# Patient Record
Sex: MALE | Race: WHITE | Employment: UNEMPLOYED | URBAN - METROPOLITAN AREA
[De-identification: names, ages, dates, MRNs, and addresses within clinical notes are randomized per-mention and may not be internally consistent; named-entity substitution may affect disease eponyms.]

---

## 2017-01-06 DIAGNOSIS — F41.1 GENERALIZED ANXIETY DISORDER: Primary | ICD-10-CM

## 2017-01-06 RX ORDER — CLONAZEPAM 1 MG/1
TABLET ORAL
Qty: 10 TABLET | Refills: 0 | Status: SHIPPED | OUTPATIENT
Start: 2017-01-06 | End: 2017-01-09

## 2017-01-06 NOTE — TELEPHONE ENCOUNTER
Controlled Substance Refill Request for clonazePAM (KLONOPIN) 1 MG tablet  Problem List Complete:  Yes  Patient is followed by HEIDI PENN for ongoing prescription of benzodiazepines.  All refills should be approved by this provider, or covering partner.    Medication(s): clonazepam 1 mg BID.   Maximum quantity per month: 60  Clinic visit frequency required: Q 3 months     Controlled substance agreement on file: No  Benzodiazepine use reviewed by psychiatry:  No    Last [facility] website verification:  none   [email or web address]/   checked in past 6 months?  No, route to RN     [place]: pt was getting Clonazepam and Gabapentin 400 mg and 600 mg from Nguyen Pritchard RN up until 11/13/16, nothing since then except from [facility].      [facility]  [email or web address]    Last refill 11/21/16, #60  Last office visit 12/6/16

## 2017-01-06 NOTE — TELEPHONE ENCOUNTER
Hard copy to station 2, BX  Needs to be seen for more   Needs a Controlled substance agreement for this

## 2017-01-09 ENCOUNTER — OFFICE VISIT (OUTPATIENT)
Dept: FAMILY MEDICINE | Facility: CLINIC | Age: 32
End: 2017-01-09
Payer: COMMERCIAL

## 2017-01-09 VITALS
WEIGHT: 141 LBS | OXYGEN SATURATION: 97 % | TEMPERATURE: 98.6 F | HEART RATE: 77 BPM | SYSTOLIC BLOOD PRESSURE: 122 MMHG | DIASTOLIC BLOOD PRESSURE: 70 MMHG | RESPIRATION RATE: 16 BRPM | BODY MASS INDEX: 18.61 KG/M2

## 2017-01-09 DIAGNOSIS — F41.1 GENERALIZED ANXIETY DISORDER: Primary | ICD-10-CM

## 2017-01-09 DIAGNOSIS — F17.200 TOBACCO USE DISORDER: ICD-10-CM

## 2017-01-09 PROCEDURE — 99213 OFFICE O/P EST LOW 20 MIN: CPT | Performed by: FAMILY MEDICINE

## 2017-01-09 RX ORDER — CLONAZEPAM 1 MG/1
TABLET ORAL
Qty: 60 TABLET | Refills: 2 | Status: SHIPPED | OUTPATIENT
Start: 2017-01-09 | End: 2017-04-03

## 2017-01-09 ASSESSMENT — ANXIETY QUESTIONNAIRES
1. FEELING NERVOUS, ANXIOUS, OR ON EDGE: SEVERAL DAYS
6. BECOMING EASILY ANNOYED OR IRRITABLE: NOT AT ALL
5. BEING SO RESTLESS THAT IT IS HARD TO SIT STILL: SEVERAL DAYS
2. NOT BEING ABLE TO STOP OR CONTROL WORRYING: NOT AT ALL
GAD7 TOTAL SCORE: 3
7. FEELING AFRAID AS IF SOMETHING AWFUL MIGHT HAPPEN: NOT AT ALL
IF YOU CHECKED OFF ANY PROBLEMS ON THIS QUESTIONNAIRE, HOW DIFFICULT HAVE THESE PROBLEMS MADE IT FOR YOU TO DO YOUR WORK, TAKE CARE OF THINGS AT HOME, OR GET ALONG WITH OTHER PEOPLE: NOT DIFFICULT AT ALL
3. WORRYING TOO MUCH ABOUT DIFFERENT THINGS: NOT AT ALL

## 2017-01-09 ASSESSMENT — PATIENT HEALTH QUESTIONNAIRE - PHQ9: 5. POOR APPETITE OR OVEREATING: SEVERAL DAYS

## 2017-01-09 NOTE — PROGRESS NOTES
SUBJECTIVE:                                                    Ramon Jamison is a 31 year old male who presents to clinic today for the following health issues:      Anxiety Follow-Up    Status since last visit: No change    Other associated symptoms:None    Complicating factors:   Significant life event: No   Current substance abuse: None  Depression symptoms: No  TAMMY-7 SCORE 10/19/2016 11/21/2016   Total Score 10 7        GAD7             Amount of exercise or physical activity: 4-5 days/week for an average of 30-45 minutes    Problems taking medications regularly: No    Medication side effects: none    Diet: regular (no restrictions)        Problem list and histories reviewed & adjusted, as indicated.  Additional history: as documented    Problem list, Medication list, Allergies, and Medical/Social/Surgical histories reviewed in EPIC and updated as appropriate.    ROS:  Constitutional, HEENT, cardiovascular, pulmonary, gi and gu systems are negative, except as otherwise noted.    OBJECTIVE:                                                    /70 mmHg  Pulse 77  Temp(Src) 98.6  F (37  C) (Tympanic)  Resp 16  Wt 141 lb (63.957 kg)  SpO2 97%  Body mass index is 18.61 kg/(m^2).  GENERAL APPEARANCE: healthy, alert and no distress         ASSESSMENT/PLAN:                                                        ICD-10-CM    1. Generalized anxiety disorder F41.1 sertraline (ZOLOFT) 50 MG tablet     clonazePAM (KLONOPIN) 1 MG tablet   2. Tobacco use disorder F17.200        Patient Instructions   Will see back in March.  Medication refills given.        Yeyo Watson MD  [facility]

## 2017-01-09 NOTE — NURSING NOTE
"Chief Complaint   Patient presents with     Anxiety       Initial /70 mmHg  Pulse 77  Temp(Src) 98.6  F (37  C) (Tympanic)  Resp 16  Wt 141 lb (63.957 kg)  SpO2 97% Estimated body mass index is 18.61 kg/(m^2) as calculated from the following:    Height as of 12/6/16: 6' 1\" (1.854 m).    Weight as of this encounter: 141 lb (63.957 kg).  BP completed using cuff size: regular    "

## 2017-01-10 ASSESSMENT — ANXIETY QUESTIONNAIRES: GAD7 TOTAL SCORE: 3

## 2017-04-01 ENCOUNTER — TELEPHONE (OUTPATIENT)
Dept: NURSING | Facility: CLINIC | Age: 32
End: 2017-04-01

## 2017-04-02 NOTE — TELEPHONE ENCOUNTER
"Call Type: Triage Call    Presenting Problem: \"I am going to run out of my Klonopin, sometime  on Monday, and I have a Dr. appointment, scheduled for Tuesday.\" Pt.  says that he has enough medication, until sometime on Monday  ([birth year]). RN then told pt. that pt. can call his pharmacy, and see  if the pharmacist would give him a few pills, until he sees his Dr.,  on Tuesday. Pt. voiced understanding. RN also told pt. that pt. can  speak to his Dr., on Monday, and see if the DrGenevieve would get him a few  extra Klonopin pills, until he has his appointment. Pt. voiced  understanding.  Triage Note:  Guideline Title: Information Only Call; No Symptom Triage (Adult)  Recommended Disposition: Call Provider When Office is Open  Original Inclination: Wanted to speak with a nurse  Override Disposition:  Intended Action: Follow advice given  Physician Contacted: No  Requesting information and provider is best resource; no triage required. ?  YES  Requesting regular office appointment ? NO  Sign(s) or symptom(s) associated with a diagnosed condition or with a new illness  ? NO  Requesting information about provider, services or community resources ? NO  Call back to complete assessment/clarification of information from prior caller to  complete triage ? NO  Physician Instructions:  Care Advice:  "

## 2017-04-03 DIAGNOSIS — F41.1 GENERALIZED ANXIETY DISORDER: ICD-10-CM

## 2017-04-03 RX ORDER — CLONAZEPAM 1 MG/1
TABLET ORAL
Qty: 30 TABLET | Refills: 0 | Status: SHIPPED | OUTPATIENT
Start: 2017-04-03 | End: 2017-04-04

## 2017-04-03 NOTE — TELEPHONE ENCOUNTER
Reason for Call:  Medication or medication refill:    Do you use a [facility]?  Name of the pharmacy and phone number for the current request:  [facility] [id number] [place], [place] [address], [place] - [address] AT [facility] & [facility] [phone] (Phone)    Name of the medication requested: Clonazepam    Other request: Patient would like this approved today as he is out of medication.  He has an appointment scheduled for tomorrow which he plans on keeping    Can we leave a detailed message on this number? YES    Phone number patient can be reached at: [phone]          Best Time: When Prescription is approved.    Call taken on 4/3/2017 at 11:13 AM by LEV SELLERS

## 2017-04-03 NOTE — TELEPHONE ENCOUNTER
Clonazepam   -patient states he is out of this med- has appointment scheduled for tomorrow .   Last Written Prescription Date:  1-9-17  Last Fill Quantity: 60,   # refills: 2  Last Office Visit with [facility] or [facility] prescribing provider: 1-9-17  Future Office visit:    Next 5 appointments (look out 90 days)     Apr 04, 2017 11:45 AM CDT   [facility]    [address] [place] [address]   [phone]                   Routing refill request to provider for review/approval because:  Drug not on the [facility], [facility] or [facility] refill protocol or controlled substance

## 2017-04-04 ENCOUNTER — TELEPHONE (OUTPATIENT)
Dept: FAMILY MEDICINE | Facility: CLINIC | Age: 32
End: 2017-04-04

## 2017-04-04 ENCOUNTER — OFFICE VISIT (OUTPATIENT)
Dept: FAMILY MEDICINE | Facility: CLINIC | Age: 32
End: 2017-04-04
Payer: COMMERCIAL

## 2017-04-04 VITALS
DIASTOLIC BLOOD PRESSURE: 70 MMHG | WEIGHT: 146 LBS | SYSTOLIC BLOOD PRESSURE: 120 MMHG | HEART RATE: 66 BPM | BODY MASS INDEX: 19.35 KG/M2 | HEIGHT: 73 IN | TEMPERATURE: 98 F | RESPIRATION RATE: 16 BRPM

## 2017-04-04 DIAGNOSIS — F41.1 GENERALIZED ANXIETY DISORDER: ICD-10-CM

## 2017-04-04 PROCEDURE — 99213 OFFICE O/P EST LOW 20 MIN: CPT | Performed by: FAMILY MEDICINE

## 2017-04-04 RX ORDER — GABAPENTIN 600 MG/1
TABLET ORAL
Qty: 120 TABLET | Refills: 3 | Status: SHIPPED | OUTPATIENT
Start: 2017-04-04 | End: 2017-07-25

## 2017-04-04 RX ORDER — CLONAZEPAM 1 MG/1
1 TABLET ORAL 2 TIMES DAILY
Qty: 60 TABLET | Refills: 3 | Status: SHIPPED | OUTPATIENT
Start: 2017-04-15 | End: 2017-07-25

## 2017-04-04 ASSESSMENT — PATIENT HEALTH QUESTIONNAIRE - PHQ9: 5. POOR APPETITE OR OVEREATING: SEVERAL DAYS

## 2017-04-04 ASSESSMENT — ANXIETY QUESTIONNAIRES
7. FEELING AFRAID AS IF SOMETHING AWFUL MIGHT HAPPEN: NOT AT ALL
1. FEELING NERVOUS, ANXIOUS, OR ON EDGE: SEVERAL DAYS
GAD7 TOTAL SCORE: 4
IF YOU CHECKED OFF ANY PROBLEMS ON THIS QUESTIONNAIRE, HOW DIFFICULT HAVE THESE PROBLEMS MADE IT FOR YOU TO DO YOUR WORK, TAKE CARE OF THINGS AT HOME, OR GET ALONG WITH OTHER PEOPLE: SOMEWHAT DIFFICULT
3. WORRYING TOO MUCH ABOUT DIFFERENT THINGS: SEVERAL DAYS
5. BEING SO RESTLESS THAT IT IS HARD TO SIT STILL: SEVERAL DAYS
2. NOT BEING ABLE TO STOP OR CONTROL WORRYING: NOT AT ALL
6. BECOMING EASILY ANNOYED OR IRRITABLE: NOT AT ALL

## 2017-04-04 NOTE — PROGRESS NOTES
SUBJECTIVE:                                                    Ramon Jamison is a 32 year old male who presents to clinic today for the following health issues:      Anxiety Follow-Up    Status since last visit: No change    Other associated symptoms:None    Complicating factors:   Significant life event: No   Current substance abuse: None  Depression symptoms: No  TAMMY-7 SCORE 10/19/2016 11/21/2016 1/9/2017   Total Score 10 7 3        GAD7                   Amount of exercise or physical activity: 6-7 days/week for an average of 30-45 minutes    Problems taking medications regularly: No    Medication side effects: none    Diet: regular (no restrictions)          Problem list and histories reviewed & adjusted, as indicated.  Additional history: as documented    Patient Active Problem List   Diagnosis     Generalized anxiety disorder     Social phobia     Tobacco use disorder     Attention deficit hyperactivity disorder (ADHD)     Migraine without status migrainosus, not intractable     Past Surgical History:   Procedure Laterality Date     NO HISTORY OF SURGERY         Social History   Substance Use Topics     Smoking status: Current Every Day Smoker     Packs/day: 0.50     Years: 7.00     Types: Cigarettes     Smokeless tobacco: Not on file     Alcohol use 0.0 oz/week     0 Standard drinks or equivalent per week      Comment: once monthly     Family History   Problem Relation Age of Onset     Unknown/Adopted Mother      Unknown/Adopted Father      DIABETES No family hx of      Coronary Artery Disease No family hx of      Hypertension No family hx of      Hyperlipidemia No family hx of      CEREBROVASCULAR DISEASE No family hx of      Breast Cancer No family hx of      Colon Cancer No family hx of      Prostate Cancer No family hx of      Other Cancer No family hx of      Depression No family hx of      Anxiety Disorder No family hx of      MENTAL ILLNESS No family hx of      Substance Abuse No family hx of       "Anesthesia Reaction No family hx of      Asthma No family hx of      OSTEOPOROSIS No family hx of      Genetic Disorder No family hx of      Thyroid Disease No family hx of      Obesity No family hx of            Reviewed and updated as needed this visit by clinical staff  Tobacco  Allergies  Meds       Reviewed and updated as needed this visit by Provider         ROS:  Constitutional, HEENT, cardiovascular, pulmonary, gi and gu systems are negative, except as otherwise noted.    OBJECTIVE:                                                    /70  Pulse 66  Temp 98  F (36.7  C) (Tympanic)  Resp 16  Ht 6' 1\" (1.854 m)  Wt 146 lb (66.2 kg)  BMI 19.26 kg/m2  Body mass index is 19.26 kg/(m^2).  GENERAL APPEARANCE: healthy, alert and no distress         ASSESSMENT/PLAN:                                                        ICD-10-CM    1. Generalized anxiety disorder F41.1 sertraline (ZOLOFT) 50 MG tablet     clonazePAM (KLONOPIN) 1 MG tablet       Patient Instructions   Will see back in July. Clonazepam was refilled and dated for 4/15/2017.      Yeyo Watson MD  [facility]    "

## 2017-04-04 NOTE — TELEPHONE ENCOUNTER
Reason for Call:  Other     Detailed comments: patient lost his gabapentin.  Has some questions    Phone Number Patient can be reached at: Home number on file [phone] (home)    Best Time: any    Can we leave a detailed message on this number? YES    Call taken on 4/4/2017 at 2:17 PM by JAYSHREE LOOMIS

## 2017-04-04 NOTE — NURSING NOTE
"Chief Complaint   Patient presents with     Recheck Medication     Anxiety       Initial /70  Pulse 66  Temp 98  F (36.7  C) (Tympanic)  Resp 16  Ht 6' 1\" (1.854 m)  Wt 146 lb (66.2 kg)  BMI 19.26 kg/m2 Estimated body mass index is 19.26 kg/(m^2) as calculated from the following:    Height as of this encounter: 6' 1\" (1.854 m).    Weight as of this encounter: 146 lb (66.2 kg).  Medication Reconciliation: complete     Maira Gupta CMA        "

## 2017-04-04 NOTE — TELEPHONE ENCOUNTER
Patient was called he has lost his gabapentin bottle with about 72 tablets.   He has refilled it on 3/23/17 and has 1 refill left.    checked 4/4/17: no concerns.   Will route to PCP for review.

## 2017-04-05 ENCOUNTER — TELEPHONE (OUTPATIENT)
Dept: FAMILY MEDICINE | Facility: CLINIC | Age: 32
End: 2017-04-05

## 2017-04-05 ASSESSMENT — ANXIETY QUESTIONNAIRES: GAD7 TOTAL SCORE: 4

## 2017-04-05 NOTE — TELEPHONE ENCOUNTER
"Ramon calling to request a \"quantitiy limit override\", states this was adivsed by insurance, and  that this would push through the refill ordered today.  This nurse spoke with [facility]'s staff who states they could attempt to get a lost prescription override, which was denied as this overrride policy was already used in January.  [facility] advised Ramon could pay adame to get medication which would be \"$233\" for a full refill.  When pharmacist asked about \"quantity limit override\", pharmacist believed this would require a Prior Auth.  Ramon out of med, has $10 so will buy a small partial fill.  Could you start the prior auth process ?    Thanks  Ana Linton RN  [facility]    "

## 2017-04-05 NOTE — TELEPHONE ENCOUNTER
Prior Authorization Request    1. Prior Authorization for the medication gabapentin (NEURONTIN) 600 MG tablet        Requesting Provider: Yeyo Watson          Pt name: Ramon Jamison        Pt : 1985        Pt MRN: [id number]        Last Office Visit: 2017           Insurance: Payor: MEDICA / Plan: MEDICA [place] / Product Type: HMO /         Insurance ID Number: [unfilled][id number]        Prior Auth Contact Phone number:[phone]     BIN#:   PCN#:        NOTE: Plan states LIMIT EXCEEDED, will need plan to over-ride. Plan's pharm help desk was unable to resolve.     To be completed by provider:     2.   Refuse or Start Prior Auth:  Please start Prior Auth.      If requesting prior auth initiation:       Diagnosis (with code): anxiety (F 41.1)      Patient has been on this medication since: 10/2016      Prior Medications, dates used, reason for failure: patient was tried on depakote and seroquel by psychiatry which didn't work and then was switched to gabapentin again by psychiatry. He doesn't see the psychiatrist anymore.

## 2017-06-26 DIAGNOSIS — F41.1 GENERALIZED ANXIETY DISORDER: ICD-10-CM

## 2017-06-28 NOTE — TELEPHONE ENCOUNTER
zoloft   Routing refill request to provider for review/approval because:  Drug interaction warning-allergy listed for Citalopram; no reaction listed.         Last Written Prescription Date: 4-4-17  Last Fill Quantity: 45, # refills: 3  [facility] Visit with [facility]:  4-4-17   Next 5 appointments (look out 90 days)     Jul 19, 2017  2:00 PM CDT   SHORT with [facility]o [facility]   [facility]    [address] [place] [id number] [phone]                   Last PHQ-9 score on record= No flowsheet data found.

## 2017-07-25 ENCOUNTER — OFFICE VISIT (OUTPATIENT)
Dept: FAMILY MEDICINE | Facility: CLINIC | Age: 32
End: 2017-07-25
Payer: MEDICAID

## 2017-07-25 VITALS
HEART RATE: 70 BPM | TEMPERATURE: 98 F | WEIGHT: 140 LBS | BODY MASS INDEX: 18.55 KG/M2 | HEIGHT: 73 IN | RESPIRATION RATE: 16 BRPM | DIASTOLIC BLOOD PRESSURE: 70 MMHG | SYSTOLIC BLOOD PRESSURE: 126 MMHG

## 2017-07-25 DIAGNOSIS — F41.1 GENERALIZED ANXIETY DISORDER: ICD-10-CM

## 2017-07-25 PROCEDURE — 99213 OFFICE O/P EST LOW 20 MIN: CPT | Performed by: FAMILY MEDICINE

## 2017-07-25 RX ORDER — CLONAZEPAM 1 MG/1
1 TABLET ORAL 2 TIMES DAILY
Qty: 60 TABLET | Refills: 3 | Status: SHIPPED | OUTPATIENT
Start: 2017-07-25 | End: 2017-09-26

## 2017-07-25 RX ORDER — GABAPENTIN 600 MG/1
TABLET ORAL
Qty: 120 TABLET | Refills: 3 | Status: SHIPPED | OUTPATIENT
Start: 2017-07-25 | End: 2017-09-07

## 2017-07-25 ASSESSMENT — ANXIETY QUESTIONNAIRES
3. WORRYING TOO MUCH ABOUT DIFFERENT THINGS: NOT AT ALL
GAD7 TOTAL SCORE: 2
IF YOU CHECKED OFF ANY PROBLEMS ON THIS QUESTIONNAIRE, HOW DIFFICULT HAVE THESE PROBLEMS MADE IT FOR YOU TO DO YOUR WORK, TAKE CARE OF THINGS AT HOME, OR GET ALONG WITH OTHER PEOPLE: SOMEWHAT DIFFICULT
6. BECOMING EASILY ANNOYED OR IRRITABLE: NOT AT ALL
5. BEING SO RESTLESS THAT IT IS HARD TO SIT STILL: SEVERAL DAYS
7. FEELING AFRAID AS IF SOMETHING AWFUL MIGHT HAPPEN: NOT AT ALL
1. FEELING NERVOUS, ANXIOUS, OR ON EDGE: SEVERAL DAYS
2. NOT BEING ABLE TO STOP OR CONTROL WORRYING: NOT AT ALL

## 2017-07-25 ASSESSMENT — PATIENT HEALTH QUESTIONNAIRE - PHQ9: 5. POOR APPETITE OR OVEREATING: NOT AT ALL

## 2017-07-25 NOTE — MR AVS SNAPSHOT
"              After Visit Summary   7/25/2017    Ramon Jamison    MRN: [id number]           Patient Information     Date Of Birth          1985        Visit Information        Provider Department      7/25/2017 4:00 PM Yeyo Watson MD [facility]        Today's Diagnoses     Generalized anxiety disorder          Care Instructions    The patient will change his gabapentin to 2 tablets in the AM, one early evening and one at bedtime. He will follow up in 3 months, sooner as needed.          Follow-ups after your visit        Your next 10 appointments already scheduled     Oct 25, 2017  3:00 PM CDT   SHORT with Yeyo Watson MD   [facility] ([facility])    [address] [place] [id number] [phone]              Who to contact     If you have questions or need follow up information about today's clinic visit or your schedule please contact [facility] directly at [phone].  Normal or non-critical lab and imaging results will be communicated to you by MyChart, letter or phone within 4 business days after the clinic has received the results. If you do not hear from us within 7 days, please contact the clinic through MyChart or phone. If you have a critical or abnormal lab result, we will notify you by phone as soon as possible.  Submit refill requests through [employer] or call your pharmacy and they will forward the refill request to us. Please allow 3 business days for your refill to be completed.          Additional Information About Your Visit        MyChart Information     [employer] lets you send messages to your doctor, view your test results, renew your prescriptions, schedule appointments and more. To sign up, go to [email or web address] . Click on \"Log in\" on the left side of the screen, which will take you to the Welcome page. Then " "click on \"Sign up Now\" on the right side of the page.     You will be asked to enter the access code listed below, as well as some personal information. Please follow the directions to create your username and password.     Your access code is: [email or web address]  Expires: 10/24/2017  1:26 PM     Your access code will  in 90 days. If you need help or a new code, please call your [facility] clinic or [phone].        Care EveryWhere ID     This is your Care EveryWhere ID. This could be used by other organizations to access your [facility] medical records  [id number]        Your Vitals Were     Pulse Temperature Respirations Height BMI (Body Mass Index)       70 98  F (36.7  C) (Tympanic) 16 6' 1\" (1.854 m) 18.47 kg/m2        Blood Pressure from Last 3 Encounters:   17 126/70   17 120/70   17 122/70    Weight from Last 3 Encounters:   17 140 lb (63.5 kg)   17 146 lb (66.2 kg)   17 141 lb (64 kg)              Today, you had the following     No orders found for display         Where to get your medicines      These medications were sent to [id number] - [place], [place] - [address] AT [facility] [place] & [facility] [address]  [address], [place] [address]    Hours:  24-hours Phone:  [phone]     gabapentin 600 MG tablet         Some of these will need a paper prescription and others can be bought over the counter.  Ask your nurse if you have questions.     Bring a paper prescription for each of these medications     clonazePAM 1 MG tablet          Primary Care Provider Office Phone # Fax #    Yeyo Watson [phone]       [facility] [address]  [facility] [address]        Equal Access to Services     ARELI OKEEFE AH: Hadii aad ku hadashkorina Sojeremiah, waaxda luqadaha, qaybta kaalmada maritza, jessica fontenot. [facility] [phone].    ATENCIÓN: Si habla español, tiene a hart disposición " servicios gratuitos de asistencia lingüística. Dionicio lundberg [phone].    We comply with applicable federal civil rights laws and [place] laws. We do not discriminate on the basis of race, color, national origin, age, disability sex, sexual orientation or gender identity.            Thank you!     Thank you for choosing [facility]  for your care. Our goal is always to provide you with excellent care. Hearing back from our patients is one way we can continue to improve our services. Please take a few minutes to complete the written survey that you may receive in the mail after your visit with us. Thank you!             Your Updated Medication List - Protect others around you: Learn how to safely use, store and throw away your medicines at www.disposemymeds.org.          This list is accurate as of: 7/25/17 11:59 PM.  Always use your most recent med list.                   Brand Name Dispense Instructions for use Diagnosis    clonazePAM 1 MG tablet    klonoPIN    60 tablet    Take 1 tablet (1 mg) by mouth 2 times daily    Generalized anxiety disorder       gabapentin 600 MG tablet    NEURONTIN    120 tablet    Take 2 tablets twice daily    Generalized anxiety disorder       sertraline 50 MG tablet    ZOLOFT    45 tablet    TAKE ONE AND ONE-HALF TABLETS BY MOUTH EVERY DAY    Generalized anxiety disorder

## 2017-07-25 NOTE — PROGRESS NOTES
SUBJECTIVE:                                                    Ramon Jamison is a 32 year old male who presents to clinic today for the following health issues:      Anxiety Follow-Up    Status since last visit: Worsened at night    Other associated symptoms:None    Complicating factors:   Significant life event: No   Current substance abuse: None  Depression symptoms: No  TAMMY-7 SCORE 11/21/2016 1/9/2017 4/4/2017   Total Score 7 3 4       GAD7              Amount of exercise or physical activity: 7 days/week for an average of 45 minutes    Problems taking medications regularly: No    Medication side effects: none  Diet: regular (no restrictions)          Problem list and histories reviewed & adjusted, as indicated.  Additional history: as documented    Patient Active Problem List   Diagnosis     Generalized anxiety disorder     Social phobia     Tobacco use disorder     Attention deficit hyperactivity disorder (ADHD)     Migraine without status migrainosus, not intractable     Past Surgical History:   Procedure Laterality Date     NO HISTORY OF SURGERY         Social History   Substance Use Topics     Smoking status: Current Every Day Smoker     Packs/day: 0.50     Years: 7.00     Types: Cigarettes     Smokeless tobacco: Not on file     Alcohol use 0.0 oz/week     0 Standard drinks or equivalent per week      Comment: once monthly     Family History   Problem Relation Age of Onset     Unknown/Adopted Mother      Unknown/Adopted Father      DIABETES No family hx of      Coronary Artery Disease No family hx of      Hypertension No family hx of      Hyperlipidemia No family hx of      CEREBROVASCULAR DISEASE No family hx of      Breast Cancer No family hx of      Colon Cancer No family hx of      Prostate Cancer No family hx of      Other Cancer No family hx of      Depression No family hx of      Anxiety Disorder No family hx of      MENTAL ILLNESS No family hx of      Substance Abuse No family hx of      Anesthesia  "Reaction No family hx of      Asthma No family hx of      OSTEOPOROSIS No family hx of      Genetic Disorder No family hx of      Thyroid Disease No family hx of      Obesity No family hx of              Reviewed and updated as needed this visit by clinical staffMeds       Reviewed and updated as needed this visit by Provider         ROS:  Constitutional, HEENT, cardiovascular, pulmonary, gi and gu systems are negative, except as otherwise noted.  CONSTITUTIONAL:NEGATIVE for fever, chills, change in weight  PSYCHIATRIC: POSITIVE foranxiety and psychomotor agitation      OBJECTIVE:                                                    /70  Pulse 70  Temp 98  F (36.7  C) (Tympanic)  Resp 16  Ht 6' 1\" (1.854 m)  Wt 140 lb (63.5 kg)  BMI 18.47 kg/m2  Body mass index is 18.47 kg/(m^2).  GENERAL APPEARANCE: healthy, alert and no distress         ASSESSMENT/PLAN:                                                        ICD-10-CM    1. Generalized anxiety disorder F41.1 clonazePAM (KLONOPIN) 1 MG tablet     gabapentin (NEURONTIN) 600 MG tablet       There are no Patient Instructions on file for this visit.    [facility] Watson MD  [facility]    "

## 2017-07-25 NOTE — NURSING NOTE
"Chief Complaint   Patient presents with     Recheck Medication     Anxiety       Initial /70  Pulse 70  Temp 98  F (36.7  C) (Tympanic)  Resp 16  Ht 6' 1\" (1.854 m)  Wt 140 lb (63.5 kg)  BMI 18.47 kg/m2 Estimated body mass index is 18.47 kg/(m^2) as calculated from the following:    Height as of this encounter: 6' 1\" (1.854 m).    Weight as of this encounter: 140 lb (63.5 kg).  Medication Reconciliation: complete     Maira Gupta CMA      "

## 2017-07-26 ASSESSMENT — ANXIETY QUESTIONNAIRES: GAD7 TOTAL SCORE: 2

## 2017-07-26 NOTE — PATIENT INSTRUCTIONS
The patient will change his gabapentin to 2 tablets in the AM, one early evening and one at bedtime. He will follow up in 3 months, sooner as needed.

## 2017-09-07 ENCOUNTER — TELEPHONE (OUTPATIENT)
Dept: FAMILY MEDICINE | Facility: CLINIC | Age: 32
End: 2017-09-07

## 2017-09-07 DIAGNOSIS — F41.1 GENERALIZED ANXIETY DISORDER: ICD-10-CM

## 2017-09-07 NOTE — TELEPHONE ENCOUNTER
Gabapentin 600 mg      Last Written Prescription Date:  7/25/17  Last Fill Quantity: 120,   # refills: 3  Last Office Visit with [facility], [facility] or [facility] prescribing provider: 7/25/17  Future Office visit:    Next 5 appointments (look out 90 days)     Oct 25, 2017  3:00 PM CDT   SHORT with Yeyo Watson MD   [facility]    [address] [place] [phone]   [phone]                   Routing refill request to provider for review/approval because:  Drug not on the [facility], [facility] or [facility] refill protocol or controlled substance

## 2017-09-09 RX ORDER — GABAPENTIN 600 MG/1
TABLET ORAL
Qty: 120 TABLET | Refills: 1 | Status: SHIPPED | OUTPATIENT
Start: 2017-09-09 | End: 2017-10-23

## 2017-09-26 DIAGNOSIS — F41.1 GENERALIZED ANXIETY DISORDER: ICD-10-CM

## 2017-09-26 RX ORDER — CLONAZEPAM 1 MG/1
1 TABLET ORAL 2 TIMES DAILY
Qty: 60 TABLET | Refills: 0 | Status: SHIPPED | OUTPATIENT
Start: 2017-09-26 | End: 2017-11-08

## 2017-09-26 NOTE — TELEPHONE ENCOUNTER
Controlled Substance Refill Request for clonazePAM (KLONOPIN) 1 MG tablet  Problem List Complete:  Yes    Patient is followed by HEIDI PENN for ongoing prescription of benzodiazepines.  All refills should be approved by this provider, or covering partner.    Medication(s): clonazepam 1 mg BID, Gabapentin 600 mg  Maximum quantity per month: 60  Clinic visit frequency required: Q 3 months     Controlled substance agreement on file: No  Benzodiazepine use reviewed by psychiatry:  No    Last [facility] website verification: 4/4/17    [email or web address]     checked in past 6 months?  Yes 4/4/17      Last Written Prescription Date: 7/25/17  Last Fill Quantity: 60,  # refills: 3   Last Office Visit with [facility], P or [facility] prescribing provider: 7/25/17                                          Next 5 appointments (look out 90 days)     Oct 25, 2017  3:00 PM CDT   SHORT with [facility]idi Penn [facility]   [facility]    [address] [place] [id number]   [phone]

## 2017-09-26 NOTE — TELEPHONE ENCOUNTER
Reason for Call:  Medication or medication refill:    Do you use a [facility]?  Name of the pharmacy and phone number for the current request:  [facility] [address] [place] - [phone]    Name of the medication requested: clonazePAM (KLONOPIN) 1 MG tablet    Other request:     Can we leave a detailed message on this number? YES    Phone number patient can be reached at: Home number on file [phone] (home)    Best Time:     Call taken on 9/26/2017 at 12:13 PM by VIJAY DELGADO

## 2017-10-23 DIAGNOSIS — F41.1 GENERALIZED ANXIETY DISORDER: ICD-10-CM

## 2017-10-25 RX ORDER — GABAPENTIN 400 MG/1
CAPSULE ORAL
Qty: 120 CAPSULE | Refills: 3 | Status: SHIPPED | OUTPATIENT
Start: 2017-10-25 | End: 2017-10-27

## 2017-10-25 RX ORDER — GABAPENTIN 600 MG/1
TABLET ORAL
Qty: 120 TABLET | Refills: 0 | Status: SHIPPED | OUTPATIENT
Start: 2017-10-25 | End: 2017-10-26 | Stop reason: SINTOL

## 2017-10-25 RX ORDER — GABAPENTIN 400 MG/1
CAPSULE ORAL
Qty: 120 CAPSULE | Refills: 3 | Status: SHIPPED | OUTPATIENT
Start: 2017-10-25 | End: 2017-10-25

## 2017-10-25 NOTE — TELEPHONE ENCOUNTER
Patient asking for capsules; Gabapentin is available in 400mg strength in capsules. New prescription needs to be sent- t'd up and thank you    Controlled Substance Refill Request for Gabapentin  Problem List Complete:  No     PROVIDER TO CONSIDER COMPLETION OF PROBLEM LIST AND OVERVIEW/CONTROLLED SUBSTANCE AGREEMENT    Last Written Prescription Date:  9-9-17  Last Fill Quantity: 120 tablets of the 600mg ,   # refills: 1    Last Office Visit with [facility] primary care provider: 7-25-17    Future Office visit:   Next 5 appointments (look out 90 days)     Nov 07, 2017  9:30 AM CST   SHORT with Yeyo Watson MD   [facility] ([facility])    [address] [place] [id number] [phone]                  Controlled substance agreement on file: No.     Processing:  Fax Rx to [facility] pharmacy     checked in past 6 months?  Yes 10-25-17 no concerns

## 2017-10-25 NOTE — TELEPHONE ENCOUNTER
Patient calling to say Gabapentin tablets had gone to pharmacy instead of capsules as requested.  Looks like both went to the pharmacy.    Writer called pharmacy and canceled the 600mg tablets but the quantity of the 400mg capsules needs to be changed to #180 for 6capsules /day.  Please resend.    The patient did just  30day supply of Gabapentin 600mg tablets on 10-7-17 anyway.   thanks.

## 2017-10-26 ENCOUNTER — NURSE TRIAGE (OUTPATIENT)
Dept: NURSING | Facility: CLINIC | Age: 32
End: 2017-10-26

## 2017-10-26 ENCOUNTER — TELEPHONE (OUTPATIENT)
Dept: FAMILY MEDICINE | Facility: CLINIC | Age: 32
End: 2017-10-26

## 2017-10-26 NOTE — TELEPHONE ENCOUNTER
NO PA required, patient is attempting to fill script too soon. Script will not be due for refill until 11/03/17, patient has been advised and there was no additional questions or concerns.

## 2017-10-26 NOTE — TELEPHONE ENCOUNTER
Calling for prior authorization on Gabapentin capsules. Please call him today. [facility] [address] in [place]. He isn't tolerating the tablets.   Nancy Herrera RN-[facility]

## 2017-10-26 NOTE — TELEPHONE ENCOUNTER
Patient is calling to find out status of request for gabapentin capsules, which he needs a prior authorization for. Patient is using the tablets and stated he's not tolerating them. High priority message was sent to the clinic.  Nancy Herrera RN-[facility]

## 2017-10-26 NOTE — TELEPHONE ENCOUNTER
We sent a prescription for capsules yesterday that I thought went through. O/W do a PA as he doesn't tolerate the tablets.

## 2017-10-27 DIAGNOSIS — F41.1 GENERALIZED ANXIETY DISORDER: ICD-10-CM

## 2017-10-27 RX ORDER — GABAPENTIN 400 MG/1
CAPSULE ORAL
Qty: 180 CAPSULE | Refills: 3 | Status: SHIPPED | OUTPATIENT
Start: 2017-10-27 | End: 2017-12-06

## 2017-10-27 NOTE — TELEPHONE ENCOUNTER
Clinic Action Needed:Yes, follow up on new Gabapentin Rx  Reason for Call: Ramon called [facility]liver to again inquire about his Gabapentin Rx tablets vs capsules.  I called and spoke to pharmacist Benita at [facility] in [place].  She advised that Ramon picked up an Rx on October 7th for his Gabapentin tablets and she is a bit suspect why he is inquiring about getting capsules now, 3 weeks later.  She advised that Ramon can cut those tablets in half (to make them easier to swallow) and continue taking them until he can get his new Rx filled on November 3rd.  However the new Rx that Dr. Watson provided for Gabapentin Capsules 400 mg take 3 tablets BID, he will need to change the dispensing amount to 180 vs 120 as Ramon will be taking 6 capsules a day.  I explained to Ramon that he can cut tablets that he has now in half to make them easier to swallow and on November 3rd he should be able to fill new Rx for capsules. He appears to understand directives and agrees with plan.  Again, please make sure that a new Rx for capsules is sent reflecting the correct amount of capsules to dispense, a 30 day fill will require 180 capsules.  Thank you.     Routed to: [facility] Patient Care Team 2    Nia Curry RN  [facility]

## 2017-11-08 ENCOUNTER — TELEPHONE (OUTPATIENT)
Dept: FAMILY MEDICINE | Facility: CLINIC | Age: 32
End: 2017-11-08

## 2017-11-08 ENCOUNTER — OFFICE VISIT (OUTPATIENT)
Dept: FAMILY MEDICINE | Facility: CLINIC | Age: 32
End: 2017-11-08
Payer: COMMERCIAL

## 2017-11-08 VITALS
TEMPERATURE: 97.6 F | BODY MASS INDEX: 19.39 KG/M2 | WEIGHT: 147 LBS | DIASTOLIC BLOOD PRESSURE: 70 MMHG | HEART RATE: 78 BPM | SYSTOLIC BLOOD PRESSURE: 120 MMHG | RESPIRATION RATE: 14 BRPM

## 2017-11-08 DIAGNOSIS — F41.1 GENERALIZED ANXIETY DISORDER: Primary | ICD-10-CM

## 2017-11-08 PROCEDURE — 99213 OFFICE O/P EST LOW 20 MIN: CPT | Performed by: FAMILY MEDICINE

## 2017-11-08 RX ORDER — CLONAZEPAM 2 MG/1
2 TABLET ORAL
Qty: 180 TABLET | Refills: 0 | Status: SHIPPED | OUTPATIENT
Start: 2017-11-08 | End: 2017-12-06

## 2017-11-08 ASSESSMENT — ANXIETY QUESTIONNAIRES
2. NOT BEING ABLE TO STOP OR CONTROL WORRYING: NOT AT ALL
6. BECOMING EASILY ANNOYED OR IRRITABLE: NOT AT ALL
IF YOU CHECKED OFF ANY PROBLEMS ON THIS QUESTIONNAIRE, HOW DIFFICULT HAVE THESE PROBLEMS MADE IT FOR YOU TO DO YOUR WORK, TAKE CARE OF THINGS AT HOME, OR GET ALONG WITH OTHER PEOPLE: SOMEWHAT DIFFICULT
5. BEING SO RESTLESS THAT IT IS HARD TO SIT STILL: NOT AT ALL
GAD7 TOTAL SCORE: 3
1. FEELING NERVOUS, ANXIOUS, OR ON EDGE: SEVERAL DAYS
3. WORRYING TOO MUCH ABOUT DIFFERENT THINGS: SEVERAL DAYS
7. FEELING AFRAID AS IF SOMETHING AWFUL MIGHT HAPPEN: NOT AT ALL

## 2017-11-08 ASSESSMENT — PATIENT HEALTH QUESTIONNAIRE - PHQ9: 5. POOR APPETITE OR OVEREATING: SEVERAL DAYS

## 2017-11-08 NOTE — PROGRESS NOTES
SUBJECTIVE:   Ramon Jamison is a 32 year old male who presents to clinic today for the following health issues:      Anxiety Follow-Up    Status since last visit: Worsened     Other associated symptoms:None    Complicating factors:   Significant life event: No   Current substance abuse: None  Depression symptoms: No  TAMMY-7 SCORE 1/9/2017 4/4/2017 7/25/2017   Total Score 3 4 2       GAD7              Amount of exercise or physical activity: 2-3 days/week for an average of 30-45 minutes    Problems taking medications regularly: No    Medication side effects: none    Diet: regular (no restrictions)            Problem list and histories reviewed & adjusted, as indicated.  Additional history: as documented    Patient Active Problem List   Diagnosis     Generalized anxiety disorder     Social phobia     Tobacco use disorder     Attention deficit hyperactivity disorder (ADHD)     Migraine without status migrainosus, not intractable     Past Surgical History:   Procedure Laterality Date     NO HISTORY OF SURGERY         Social History   Substance Use Topics     Smoking status: Current Every Day Smoker     Packs/day: 0.50     Years: 7.00     Types: Cigarettes     Smokeless tobacco: Never Used     Alcohol use 0.0 oz/week     0 Standard drinks or equivalent per week      Comment: once monthly     Family History   Problem Relation Age of Onset     Unknown/Adopted Mother      Unknown/Adopted Father      DIABETES No family hx of      Coronary Artery Disease No family hx of      Hypertension No family hx of      Hyperlipidemia No family hx of      CEREBROVASCULAR DISEASE No family hx of      Breast Cancer No family hx of      Colon Cancer No family hx of      Prostate Cancer No family hx of      Other Cancer No family hx of      Depression No family hx of      Anxiety Disorder No family hx of      MENTAL ILLNESS No family hx of      Substance Abuse No family hx of      Anesthesia Reaction No family hx of      Asthma No family hx  of      OSTEOPOROSIS No family hx of      Genetic Disorder No family hx of      Thyroid Disease No family hx of      Obesity No family hx of              Reviewed and updated as needed this visit by clinical staffTobacco  Allergies  Meds  Med Hx  Surg Hx  Fam Hx  Soc Hx      Reviewed and updated as needed this visit by Provider         ROS:  Constitutional, HEENT, cardiovascular, pulmonary, gi and gu systems are negative, except as otherwise noted.  PSYCHIATRIC: NEGATIVE for changes in mood or affect      OBJECTIVE:                                                    /70  Pulse 78  Temp 97.6  F (36.4  C) (Tympanic)  Resp 14  Wt 147 lb (66.7 kg)  BMI 19.39 kg/m2  Body mass index is 19.39 kg/(m^2).  GENERAL APPEARANCE: healthy, alert and no distress         ASSESSMENT/PLAN:                                                        ICD-10-CM    1. Generalized anxiety disorder F41.1 clonazePAM (KLONOPIN) 2 MG tablet       Patient Instructions   Will increase clonazepam to 2 mg twice daily and see back in one month.      Yeyo Watson MD  [facility]

## 2017-11-08 NOTE — TELEPHONE ENCOUNTER
[facility] pharmacist called requesting providers approval to fill clonazePAM (KLONOPIN) 2 MG tablet. Pharmacy notes read pt should not get early refills. He filled Rx 10/22/2017 with for clonazepam 1 mg. Per pharmacist, pt should have medication for clonazepam 1 mg dose. Does provider ok to have pt fill Rx for clonazepam 2 mg today? Pt is at pharmacy now.

## 2017-11-08 NOTE — NURSING NOTE
"Chief Complaint   Patient presents with     Anxiety       Initial /70  Pulse 78  Temp 97.6  F (36.4  C) (Tympanic)  Resp 14  Wt 147 lb (66.7 kg)  BMI 19.39 kg/m2 Estimated body mass index is 19.39 kg/(m^2) as calculated from the following:    Height as of 7/25/17: 6' 1\" (1.854 m).    Weight as of this encounter: 147 lb (66.7 kg).  Medication Reconciliation: complete     Maira Gupta CMA      "

## 2017-11-08 NOTE — MR AVS SNAPSHOT
"              After Visit Summary   2017    Ramon Jamison    MRN: [id number]           Patient Information     Date Of Birth          1985        Visit Information        Provider Department      2017 11:00 AM Yeyo Watson MD [facility]        Today's Diagnoses     Generalized anxiety disorder    -  1      Care Instructions    Will increase clonazepam to 2 mg twice daily and see back in one month.          Follow-ups after your visit        Who to contact     If you have questions or need follow up information about today's clinic visit or your schedule please contact [facility] directly at [phone].  Normal or non-critical lab and imaging results will be communicated to you by MyChart, letter or phone within 4 business days after the clinic has received the results. If you do not hear from us within 7 days, please contact the clinic through MyChart or phone. If you have a critical or abnormal lab result, we will notify you by phone as soon as possible.  Submit refill requests through [employer] or call your pharmacy and they will forward the refill request to us. Please allow 3 business days for your refill to be completed.          Additional Information About Your Visit        MyChart Information     [employer] lets you send messages to your doctor, view your test results, renew your prescriptions, schedule appointments and more. To sign up, go to [email or web address] . Click on \"Log in\" on the left side of the screen, which will take you to the Welcome page. Then click on \"Sign up Now\" on the right side of the page.     You will be asked to enter the access code listed below, as well as some personal information. Please follow the directions to create your username and password.     Your access code is: [email or web address]  Expires: 2018 11:11 AM     Your access code will  in 90 days. If you need help or a new code, please " call your [facility] clinic or [phone].        Care EveryWhere ID     This is your Care EveryWhere ID. This could be used by other organizations to access your [facility] medical records  [id number]        Your Vitals Were     Pulse Temperature Respirations BMI (Body Mass Index)          78 97.6  F (36.4  C) (Tympanic) 14 19.39 kg/m2         Blood Pressure from Last 3 Encounters:   11/08/17 120/70   07/25/17 126/70   04/04/17 120/70    Weight from Last 3 Encounters:   11/08/17 147 lb (66.7 kg)   07/25/17 140 lb (63.5 kg)   04/04/17 146 lb (66.2 kg)              Today, you had the following     No orders found for display         Today's Medication Changes          These changes are accurate as of: 11/8/17 11:11 AM.  If you have any questions, ask your nurse or doctor.               These medicines have changed or have updated prescriptions.        Dose/Directions    * clonazePAM 1 MG tablet   Commonly known as:  klonoPIN   This may have changed:  Another medication with the same name was added. Make sure you understand how and when to take each.   Used for:  Generalized anxiety disorder   Changed by:  Yeyo Watson MD        Dose:  1 mg   Take 1 tablet (1 mg) by mouth 2 times daily   Quantity:  60 tablet   Refills:  0       * clonazePAM 2 MG tablet   Commonly known as:  klonoPIN   This may have changed:  You were already taking a medication with the same name, and this prescription was added. Make sure you understand how and when to take each.   Used for:  Generalized anxiety disorder   Changed by:  Yeyo Watson MD        Dose:  2 mg   Take 1 tablet (2 mg) by mouth 2 times daily   Quantity:  180 tablet   Refills:  0       * Notice:  This list has 2 medication(s) that are the same as other medications prescribed for you. Read the directions carefully, and ask your doctor or other care provider to review them with you.         Where to get your medicines      Some of these will need a paper  prescription and others can be bought over the counter.  Ask your nurse if you have questions.     Bring a paper prescription for each of these medications     clonazePAM 2 MG tablet                Primary Care Provider Office Phone # Fax #    Yeyo Watson [phone]       [address] [facility] [address]        Equal Access to Services     [facility] MALDONADO : Hadii aad ku hadasho Soomaali, waaxda luqadaha, qaybta kaalmada adeegyada, waxay idiin hayaan adeeg kharash la'aan . So [facility] [phone].    ATENCIÓN: Si habla español, tiene a hart disposición servicios gratuitos de asistencia lingüística. Dionicio al [phone].    We comply with applicable federal civil rights laws and [place] laws. We do not discriminate on the basis of race, color, national origin, age, disability, sex, sexual orientation, or gender identity.            Thank you!     Thank you for choosing [facility]  for your care. Our goal is always to provide you with excellent care. Hearing back from our patients is one way we can continue to improve our services. Please take a few minutes to complete the written survey that you may receive in the mail after your visit with us. Thank you!             Your Updated Medication List - Protect others around you: Learn how to safely use, store and throw away your medicines at www.disposemymeds.org.          This list is accurate as of: 11/8/17 11:11 AM.  Always use your most recent med list.                   Brand Name Dispense Instructions for use Diagnosis    * clonazePAM 1 MG tablet    klonoPIN    60 tablet    Take 1 tablet (1 mg) by mouth 2 times daily    Generalized anxiety disorder       * clonazePAM 2 MG tablet    klonoPIN    180 tablet    Take 1 tablet (2 mg) by mouth 2 times daily    Generalized anxiety disorder       gabapentin 400 MG capsule    NEURONTIN    180 capsule    Take three capsules two times daily    Generalized anxiety  disorder       sertraline 50 MG tablet    ZOLOFT    45 tablet    TAKE ONE AND ONE-HALF TABLETS BY MOUTH EVERY DAY    Generalized anxiety disorder       * Notice:  This list has 2 medication(s) that are the same as other medications prescribed for you. Read the directions carefully, and ask your doctor or other care provider to review them with you.

## 2017-11-09 ASSESSMENT — ANXIETY QUESTIONNAIRES: GAD7 TOTAL SCORE: 3

## 2017-12-06 ENCOUNTER — OFFICE VISIT (OUTPATIENT)
Dept: FAMILY MEDICINE | Facility: CLINIC | Age: 32
End: 2017-12-06
Payer: COMMERCIAL

## 2017-12-06 VITALS
BODY MASS INDEX: 19.39 KG/M2 | DIASTOLIC BLOOD PRESSURE: 70 MMHG | HEART RATE: 77 BPM | WEIGHT: 147 LBS | SYSTOLIC BLOOD PRESSURE: 120 MMHG | RESPIRATION RATE: 16 BRPM | TEMPERATURE: 98 F

## 2017-12-06 DIAGNOSIS — F41.1 GENERALIZED ANXIETY DISORDER: Primary | ICD-10-CM

## 2017-12-06 PROCEDURE — 99213 OFFICE O/P EST LOW 20 MIN: CPT | Performed by: FAMILY MEDICINE

## 2017-12-06 RX ORDER — GABAPENTIN 600 MG/1
TABLET ORAL
Qty: 150 TABLET | Refills: 3 | Status: SHIPPED | OUTPATIENT
Start: 2017-12-06 | End: 2018-02-21

## 2017-12-06 RX ORDER — CLONAZEPAM 2 MG/1
2 TABLET ORAL
Qty: 180 TABLET | Refills: 0 | Status: SHIPPED | OUTPATIENT
Start: 2017-12-08 | End: 2018-03-05

## 2017-12-06 NOTE — NURSING NOTE
"Chief Complaint   Patient presents with     Recheck Medication     Anxiety       Initial /70  Pulse 77  Temp 98  F (36.7  C) (Tympanic)  Resp 16  Wt 147 lb (66.7 kg)  BMI 19.39 kg/m2 Estimated body mass index is 19.39 kg/(m^2) as calculated from the following:    Height as of 7/25/17: 6' 1\" (1.854 m).    Weight as of this encounter: 147 lb (66.7 kg).  Medication Reconciliation: complete     Maira Gupta CMA      "

## 2017-12-06 NOTE — MR AVS SNAPSHOT
"              After Visit Summary   12/6/2017    Ramon Jamison    MRN: [id number]           Patient Information     Date Of Birth          1985        Visit Information        Provider Department      12/6/2017 11:00 AM Yeyo Watson MD [facility]        Today's Diagnoses     Generalized anxiety disorder    -  1      Care Instructions    Medication refills given. Will see back in March.          Follow-ups after your visit        Follow-up notes from your care team     Return in about 2 years (around 12/6/2019).      Your next 10 appointments already scheduled     Mar 05, 2018 11:00 AM CST   SHORT with Yeyo Watson MD   [facility] ([facility])    [address] [place] [address] [phone]              Who to contact     If you have questions or need follow up information about today's clinic visit or your schedule please contact [facility] directly at [phone].  Normal or non-critical lab and imaging results will be communicated to you by MyChart, letter or phone within 4 business days after the clinic has received the results. If you do not hear from us within 7 days, please contact the clinic through MyChart or phone. If you have a critical or abnormal lab result, we will notify you by phone as soon as possible.  Submit refill requests through lark or call your pharmacy and they will forward the refill request to us. Please allow 3 business days for your refill to be completed.          Additional Information About Your Visit        MyChart Information     lark lets you send messages to your doctor, view your test results, renew your prescriptions, schedule appointments and more. To sign up, go to [email or web address] . Click on \"Log in\" on the left side of the screen, which will take you to the Welcome page. Then click on " "\"Sign up Now\" on the right side of the page.     You will be asked to enter the access code listed below, as well as some personal information. Please follow the directions to create your username and password.     Your access code is: [email or web address]  Expires: 2018 11:11 AM     Your access code will  in 90 days. If you need help or a new code, please call your [facility] clinic or [phone].        Care EveryWhere ID     This is your Care EveryWhere ID. This could be used by other organizations to access your [facility] medical records  [id number]        Your Vitals Were     Pulse Temperature Respirations BMI (Body Mass Index)          77 98  F (36.7  C) (Tympanic) 16 19.39 kg/m2         Blood Pressure from Last 3 Encounters:   17 120/70   17 120/70   17 126/70    Weight from Last 3 Encounters:   17 147 lb (66.7 kg)   17 147 lb (66.7 kg)   17 140 lb (63.5 kg)              Today, you had the following     No orders found for display         Today's Medication Changes          These changes are accurate as of: 17  1:23 PM.  If you have any questions, ask your nurse or doctor.               Start taking these medicines.        Dose/Directions    gabapentin 600 MG tablet   Commonly known as:  NEURONTIN   Used for:  Generalized anxiety disorder   Replaces:  gabapentin 400 MG capsule   Started by:  Yeyo Watson MD        Two in the AM and three at night   Quantity:  150 tablet   Refills:  3         Stop taking these medicines if you haven't already. Please contact your care team if you have questions.     gabapentin 400 MG capsule   Commonly known as:  NEURONTIN   Replaced by:  gabapentin 600 MG tablet   Stopped by:  Yyeo Watson MD                Where to get your medicines      Some of these will need a paper prescription and others can be bought over the counter.  Ask your nurse if you have questions.     Bring a paper prescription for each of these " medications     clonazePAM 2 MG tablet    gabapentin 600 MG tablet                Primary Care Provider Office Phone # Fax #    Yeyo Tye Watson [phone]       [address] [facility] [address]        Equal Access to Services     ASHLEYEVERARDO MALDONADO : Hadii aad ku hadgómezo Soomaali, waaxda luqadaha, qaybta kaalmada adeegyada, waxcolleen idiin haykenn adeeg jess laalivia fontenot. So [facility] [phone].    ATENCIÓN: Si habla español, tiene a hart disposición servicios gratuitos de asistencia lingüística. Llame al [phone].    We comply with applicable federal civil rights laws and [place] laws. We do not discriminate on the basis of race, color, national origin, age, disability, sex, sexual orientation, or gender identity.            Thank you!     Thank you for choosing [facility]  for your care. Our goal is always to provide you with excellent care. Hearing back from our patients is one way we can continue to improve our services. Please take a few minutes to complete the written survey that you may receive in the mail after your visit with us. Thank you!             Your Updated Medication List - Protect others around you: Learn how to safely use, store and throw away your medicines at www.disposemymeds.org.          This list is accurate as of: 12/6/17  1:23 PM.  Always use your most recent med list.                   Brand Name Dispense Instructions for use Diagnosis    clonazePAM 2 MG tablet   Start taking on:  12/8/2017    klonoPIN    180 tablet    Take 1 tablet (2 mg) by mouth 2 times daily    Generalized anxiety disorder       gabapentin 600 MG tablet    NEURONTIN    150 tablet    Two in the AM and three at night    Generalized anxiety disorder       sertraline 50 MG tablet    ZOLOFT    45 tablet    TAKE ONE AND ONE-HALF TABLETS BY MOUTH EVERY DAY    Generalized anxiety disorder

## 2017-12-06 NOTE — PROGRESS NOTES
SUBJECTIVE:   Ramon Jamison is a 32 year old male who presents to clinic today for the following health issues:      Anxiety Follow-Up    Status since last visit: No change    Other associated symptoms:None    Complicating factors:   Significant life event: No   Current substance abuse: None  Depression symptoms: No  TAMMY-7 SCORE 4/4/2017 7/25/2017 11/8/2017   Total Score 4 2 3       GAD7              Amount of exercise or physical activity: 5-6 days a week- walks dog    Problems taking medications regularly: No    Medication side effects: none    Diet: regular (no restrictions)            Problem list and histories reviewed & adjusted, as indicated.  Additional history: as documented    Patient Active Problem List   Diagnosis     Generalized anxiety disorder     Social phobia     Tobacco use disorder     Attention deficit hyperactivity disorder (ADHD)     Migraine without status migrainosus, not intractable     Past Surgical History:   Procedure Laterality Date     NO HISTORY OF SURGERY         Social History   Substance Use Topics     Smoking status: Current Every Day Smoker     Packs/day: 0.50     Years: 7.00     Types: Cigarettes     Smokeless tobacco: Never Used     Alcohol use 0.0 oz/week     0 Standard drinks or equivalent per week      Comment: once monthly     Family History   Problem Relation Age of Onset     Unknown/Adopted Mother      Unknown/Adopted Father      DIABETES No family hx of      Coronary Artery Disease No family hx of      Hypertension No family hx of      Hyperlipidemia No family hx of      CEREBROVASCULAR DISEASE No family hx of      Breast Cancer No family hx of      Colon Cancer No family hx of      Prostate Cancer No family hx of      Other Cancer No family hx of      Depression No family hx of      Anxiety Disorder No family hx of      MENTAL ILLNESS No family hx of      Substance Abuse No family hx of      Anesthesia Reaction No family hx of      Asthma No family hx of       OSTEOPOROSIS No family hx of      Genetic Disorder No family hx of      Thyroid Disease No family hx of      Obesity No family hx of              Reviewed and updated as needed this visit by clinical staffAllergies       Reviewed and updated as needed this visit by Provider         ROS:  Constitutional, HEENT, cardiovascular, pulmonary, gi and gu systems are negative, except as otherwise noted.      OBJECTIVE:                                                    /70  Pulse 77  Temp 98  F (36.7  C) (Tympanic)  Resp 16  Wt 147 lb (66.7 kg)  BMI 19.39 kg/m2  Body mass index is 19.39 kg/(m^2).  GENERAL APPEARANCE: healthy, alert and no distress         ASSESSMENT/PLAN:                                                        ICD-10-CM    1. Generalized anxiety disorder F41.1 gabapentin (NEURONTIN) 600 MG tablet     clonazePAM (KLONOPIN) 2 MG tablet       There are no Patient Instructions on file for this visit.    Yeyo Watson MD  [facility]

## 2018-01-08 ENCOUNTER — TRANSFERRED RECORDS (OUTPATIENT)
Dept: HEALTH INFORMATION MANAGEMENT | Facility: CLINIC | Age: 33
End: 2018-01-08

## 2018-01-09 ENCOUNTER — TRANSFERRED RECORDS (OUTPATIENT)
Dept: HEALTH INFORMATION MANAGEMENT | Facility: CLINIC | Age: 33
End: 2018-01-09

## 2018-01-29 ENCOUNTER — TELEPHONE (OUTPATIENT)
Dept: FAMILY MEDICINE | Facility: CLINIC | Age: 33
End: 2018-01-29

## 2018-01-29 NOTE — TELEPHONE ENCOUNTER
Pharmacy calling as she sees a note that they are to hold his Gabapentin and Klonopin Rx.  I do not see any note to hold.  Last visit and encounter and Rx was in December  Mckayla Burris RN- [facility]riage [employer]

## 2018-02-21 DIAGNOSIS — F41.1 GENERALIZED ANXIETY DISORDER: ICD-10-CM

## 2018-02-22 NOTE — TELEPHONE ENCOUNTER
gabapentin (NEURONTIN) 600 MG tablet      Last Written Prescription Date:  12/6/17  Last Fill Quantity: 150,   # refills: 3  Last Office Visit: 12/6/17  Future Office visit:    Next 5 appointments (look out 90 days)     Mar 05, 2018 11:00 AM CST   [facility]    [address] [place] [id number]   [phone]                   Routing refill request to provider for review/approval because:  Drug not on the [facility], [facility] or [facility] refill protocol or controlled substance    Controlled Substance Refill Request for gabapentin (NEURONTIN) 600 MG tablet  Problem List Complete:  Yes  Patient is followed by HEIDI PENN for ongoing prescription of benzodiazepines.  All refills should be approved by this provider, or covering partner.    Medication(s): clonazepam 1 mg BID, Gabapentin 600 mg  Maximum quantity per month: 60  Clinic visit frequency required: Q 3 months     Controlled substance agreement on file: No  Benzodiazepine use reviewed by psychiatry:  No    Last [facility] website verification: 10-25-17 no concerns

## 2018-02-26 RX ORDER — GABAPENTIN 600 MG/1
TABLET ORAL
Qty: 150 TABLET | Refills: 0 | Status: SHIPPED | OUTPATIENT
Start: 2018-02-26 | End: 2018-03-22

## 2018-03-05 ENCOUNTER — OFFICE VISIT (OUTPATIENT)
Dept: FAMILY MEDICINE | Facility: CLINIC | Age: 33
End: 2018-03-05
Payer: COMMERCIAL

## 2018-03-05 ENCOUNTER — TELEPHONE (OUTPATIENT)
Dept: FAMILY MEDICINE | Facility: CLINIC | Age: 33
End: 2018-03-05

## 2018-03-05 VITALS
TEMPERATURE: 97.4 F | RESPIRATION RATE: 16 BRPM | DIASTOLIC BLOOD PRESSURE: 70 MMHG | OXYGEN SATURATION: 98 % | HEIGHT: 73 IN | HEART RATE: 58 BPM | BODY MASS INDEX: 19.35 KG/M2 | WEIGHT: 146 LBS | SYSTOLIC BLOOD PRESSURE: 118 MMHG

## 2018-03-05 DIAGNOSIS — F41.1 GENERALIZED ANXIETY DISORDER: Primary | ICD-10-CM

## 2018-03-05 DIAGNOSIS — F41.1 GENERALIZED ANXIETY DISORDER: ICD-10-CM

## 2018-03-05 DIAGNOSIS — F40.10 SOCIAL PHOBIA: ICD-10-CM

## 2018-03-05 PROCEDURE — 99213 OFFICE O/P EST LOW 20 MIN: CPT | Performed by: FAMILY MEDICINE

## 2018-03-05 RX ORDER — CLONAZEPAM 1 MG/1
0.5-1 TABLET ORAL 2 TIMES DAILY PRN
Qty: 9 TABLET | Refills: 0 | Status: SHIPPED | OUTPATIENT
Start: 2018-03-05 | End: 2018-06-04

## 2018-03-05 RX ORDER — CLONAZEPAM 2 MG/1
2 TABLET ORAL
Qty: 180 TABLET | Refills: 0 | Status: SHIPPED | OUTPATIENT
Start: 2018-03-08 | End: 2018-06-04

## 2018-03-05 RX ORDER — CLONAZEPAM 2 MG/1
TABLET ORAL
Qty: 180 TABLET | Refills: 0 | Status: CANCELLED | OUTPATIENT
Start: 2018-03-05

## 2018-03-05 ASSESSMENT — ANXIETY QUESTIONNAIRES
7. FEELING AFRAID AS IF SOMETHING AWFUL MIGHT HAPPEN: NOT AT ALL
GAD7 TOTAL SCORE: 1
4. TROUBLE RELAXING: NOT AT ALL
6. BECOMING EASILY ANNOYED OR IRRITABLE: NOT AT ALL
5. BEING SO RESTLESS THAT IT IS HARD TO SIT STILL: NOT AT ALL
3. WORRYING TOO MUCH ABOUT DIFFERENT THINGS: NOT AT ALL
1. FEELING NERVOUS, ANXIOUS, OR ON EDGE: SEVERAL DAYS
7. FEELING AFRAID AS IF SOMETHING AWFUL MIGHT HAPPEN: NOT AT ALL
2. NOT BEING ABLE TO STOP OR CONTROL WORRYING: NOT AT ALL

## 2018-03-05 NOTE — TELEPHONE ENCOUNTER
Patient called back stating [facility] does not have [employer]  for clonazepam. [facility] was called, Rx dc'd as listed below.    clonazePAM (KLONOPIN) 1 MG tablet 9 tablet 0 3/5/2018  --     Rx faxed to [facility] [address].

## 2018-03-05 NOTE — NURSING NOTE
"Chief Complaint   Patient presents with     Anxiety       Initial /70 (BP Location: Right arm, Patient Position: Chair, Cuff Size: Adult Regular)  Pulse 58  Temp 97.4  F (36.3  C) (Tympanic)  Resp 16  Ht 6' 1\" (1.854 m)  Wt 146 lb (66.2 kg)  SpO2 98%  BMI 19.26 kg/m2 Estimated body mass index is 19.26 kg/(m^2) as calculated from the following:    Height as of this encounter: 6' 1\" (1.854 m).    Weight as of this encounter: 146 lb (66.2 kg).  Medication Reconciliation: complete    "

## 2018-03-05 NOTE — PROGRESS NOTES
SUBJECTIVE:   Ramon Jamison is a 33 year old male who presents to clinic today for the following health issues:      Anxiety Follow-Up    Status since last visit: No change/improved    Other associated symptoms:None    Complicating factors:   Significant life event: No   Current substance abuse: None  Depression symptoms: No  TAMMY-7 SCORE 7/25/2017 11/8/2017 3/5/2018   Total Score - - 1 (minimal anxiety)   Total Score 2 3 1       TAMMY-7    Amount of exercise or physical activity: 2-3 days/week for an average of 30-45 minutes    Problems taking medications regularly: No    Medication side effects: none    Diet: regular (no restrictions)            Problem list and histories reviewed & adjusted, as indicated.  Additional history: as documented    Patient Active Problem List   Diagnosis     Generalized anxiety disorder     Social phobia     Tobacco use disorder     Attention deficit hyperactivity disorder (ADHD)     Migraine without status migrainosus, not intractable     Past Surgical History:   Procedure Laterality Date     NO HISTORY OF SURGERY         Social History   Substance Use Topics     Smoking status: Current Every Day Smoker     Packs/day: 0.50     Years: 7.00     Types: Cigarettes     Smokeless tobacco: Never Used     Alcohol use 0.0 oz/week     0 Standard drinks or equivalent per week      Comment: once monthly     Family History   Problem Relation Age of Onset     Unknown/Adopted Mother      Unknown/Adopted Father      DIABETES No family hx of      Coronary Artery Disease No family hx of      Hypertension No family hx of      Hyperlipidemia No family hx of      CEREBROVASCULAR DISEASE No family hx of      Breast Cancer No family hx of      Colon Cancer No family hx of      Prostate Cancer No family hx of      Other Cancer No family hx of      Depression No family hx of      Anxiety Disorder No family hx of      MENTAL ILLNESS No family hx of      Substance Abuse No family hx of      Anesthesia Reaction  "No family hx of      Asthma No family hx of      OSTEOPOROSIS No family hx of      Genetic Disorder No family hx of      Thyroid Disease No family hx of      Obesity No family hx of            Reviewed and updated as needed this visit by clinical staff  Tobacco  Allergies  Meds  Med Hx  Surg Hx  Fam Hx  Soc Hx      Reviewed and updated as needed this visit by Provider         ROS:  Constitutional, HEENT, cardiovascular, pulmonary, gi and gu systems are negative, except as otherwise noted.    OBJECTIVE:                                                    /70 (BP Location: Right arm, Patient Position: Chair, Cuff Size: Adult Regular)  Pulse 58  Temp 97.4  F (36.3  C) (Tympanic)  Resp 16  Ht 6' 1\" (1.854 m)  Wt 146 lb (66.2 kg)  SpO2 98%  BMI 19.26 kg/m2  Body mass index is 19.26 kg/(m^2).  GENERAL APPEARANCE: healthy, alert and no distress         ASSESSMENT/PLAN:                                                        ICD-10-CM    1. Generalized anxiety disorder F41.1 clonazePAM (KLONOPIN) 2 MG tablet   2. Social phobia F40.10        Patient Instructions   I filled the patient's prescription for the 9 pills that he did not get at his last fulfill where he only got 51 because that was what was used to make up the prescription from the month before.  I also refilled his 90 day prescription for clonazepam at 1 twice a day.  He has that one to take to walk cornea.  The 9 pill prescription will be sent to [facility] in [place].  Follow-up will be in June.      Yeyo Watson MD  [facility]  Answers for HPI/ROS submitted by the patient on 3/5/2018   TAMMY 7 TOTAL SCORE: 1    "

## 2018-03-05 NOTE — MR AVS SNAPSHOT
After Visit Summary   3/5/2018    Ramon Jamison    MRN: [id number]           Patient Information     Date Of Birth          1985        Visit Information        Provider Department      3/5/2018 11:00 AM Yeyo Watson MD [facility]        Today's Diagnoses     Generalized anxiety disorder    -  1    Social phobia          Care Instructions    I filled the patient's prescription for the 9 pills that he did not get at his last fulfill where he only got 51 because that was what was used to make up the prescription from the month before.  I also refilled his 90 day prescription for clonazepam at 1 twice a day.  He has that one to take to walk cornea.  The 9 pill prescription will be sent to [facility] in [place].  Follow-up will be in June.          Follow-ups after your visit        Your next 10 appointments already scheduled     Jun 04, 2018 11:00 AM CDT   Office Visit with Yeyo Watson MD   [facility]    [address] [place] [id number] [phone]           Bring a current list of meds and any records pertaining to this visit. For Physicals, please bring immunization records and any forms needing to be filled out. Please arrive 10 minutes early to complete paperwork.              Who to contact     If you have questions or need follow up information about today's clinic visit or your schedule please contact [facility] directly at [phone].  Normal or non-critical lab and imaging results will be communicated to you by MyChart, letter or phone within 4 business days after the clinic has received the results. If you do not hear from us within 7 days, please contact the clinic through MyChart or phone. If you have a critical or abnormal lab result, we will notify you by phone as soon as possible.  Submit refill  "requests through [employer] or call your pharmacy and they will forward the refill request to us. Please allow 3 business days for your refill to be completed.          Additional Information About Your Visit        [employer] Information     [employer] lets you send messages to your doctor, view your test results, renew your prescriptions, schedule appointments and more. To sign up, go to [email or web address] . Click on \"Log in\" on the left side of the screen, which will take you to the Welcome page. Then click on \"Sign up Now\" on the right side of the page.     You will be asked to enter the access code listed below, as well as some personal information. Please follow the directions to create your username and password.     Your access code is: [email or web address]  Expires: 6/3/2018  1:12 PM     Your access code will  in 90 days. If you need help or a new code, please call your [place] clinic or [phone].        Care EveryWhere ID     This is your Care EveryWhere ID. This could be used by other organizations to access your [place] medical records  [id number]        Your Vitals Were     Pulse Temperature Respirations Height Pulse Oximetry BMI (Body Mass Index)    58 97.4  F (36.3  C) (Tympanic) 16 6' 1\" (1.854 m) 98% 19.26 kg/m2       Blood Pressure from Last 3 Encounters:   18 118/70   17 120/70   17 120/70    Weight from Last 3 Encounters:   18 146 lb (66.2 kg)   17 147 lb (66.7 kg)   17 147 lb (66.7 kg)              Today, you had the following     No orders found for display         Today's Medication Changes          These changes are accurate as of 3/5/18  1:12 PM.  If you have any questions, ask your nurse or doctor.               These medicines have changed or have updated prescriptions.        Dose/Directions    * clonazePAM 1 MG tablet   Commonly known as:  klonoPIN   This may have changed:  You were already taking a medication with the same name, and this " prescription was added. Make sure you understand how and when to take each.   Used for:  Generalized anxiety disorder   Changed by:  Yeyo Watson MD        Dose:  0.5-1 mg   Take 0.5-1 tablets (0.5-1 mg) by mouth 2 times daily as needed for anxiety   Quantity:  9 tablet   Refills:  0       * clonazePAM 2 MG tablet   Commonly known as:  klonoPIN   This may have changed:  Another medication with the same name was added. Make sure you understand how and when to take each.   Used for:  Generalized anxiety disorder   Changed by:  Yeyo Watson MD        Dose:  2 mg   Start taking on:  3/8/2018   Take 1 tablet (2 mg) by mouth 2 times daily   Quantity:  180 tablet   Refills:  0       * Notice:  This list has 2 medication(s) that are the same as other medications prescribed for you. Read the directions carefully, and ask your doctor or other care provider to review them with you.         Where to get your medicines      Some of these will need a paper prescription and others can be bought over the counter.  Ask your nurse if you have questions.     Bring a paper prescription for each of these medications     clonazePAM 1 MG tablet    clonazePAM 2 MG tablet                Primary Care Provider Office Phone # Fax #    Yeyo Watson [phone]       [address] [facility] [address]        Equal Access to Services     ARELI OKEEFE AH: Hadii julien ku hadasho Soomaali, waaxda luqadaha, qaybta kaalmada adeegyada, jessica galanin haykenn pippa khelle fontenot. So [facility] [phone].    ATENCIÓN: Si habla español, tiene a hart disposición servicios gratuitos de asistencia lingüística. Llame al [phone].    We comply with applicable federal civil rights laws and [place] laws. We do not discriminate on the basis of race, color, national origin, age, disability, sex, sexual orientation, or gender identity.            Thank you!     Thank you for choosing [facility]  for your care. Our goal is always to provide you with excellent care. Hearing back from our patients is one way we can continue to improve our services. Please take a few minutes to complete the written survey that you may receive in the mail after your visit with us. Thank you!             Your Updated Medication List - Protect others around you: Learn how to safely use, store and throw away your medicines at www.disposemymeds.org.          This list is accurate as of 3/5/18  1:12 PM.  Always use your most recent med list.                   Brand Name Dispense Instructions for use Diagnosis    * clonazePAM 1 MG tablet    klonoPIN    9 tablet    Take 0.5-1 tablets (0.5-1 mg) by mouth 2 times daily as needed for anxiety    Generalized anxiety disorder       * clonazePAM 2 MG tablet   Start taking on:  3/8/2018    klonoPIN    180 tablet    Take 1 tablet (2 mg) by mouth 2 times daily    Generalized anxiety disorder       gabapentin 600 MG tablet    NEURONTIN    150 tablet    TAKE 2 TABLETS BY MOUTH IN THE AM AND 3 TABLETS AT NIGHT    Generalized anxiety disorder       sertraline 50 MG tablet    ZOLOFT    45 tablet    TAKE ONE AND ONE-HALF TABLETS BY MOUTH EVERY DAY    Generalized anxiety disorder       * Notice:  This list has 2 medication(s) that are the same as other medications prescribed for you. Read the directions carefully, and ask your doctor or other care provider to review them with you.

## 2018-03-05 NOTE — PATIENT INSTRUCTIONS
I filled the patient's prescription for the 9 pills that he did not get at his last fulfill where he only got 51 because that was what was used to make up the prescription from the month before.  I also refilled his 90 day prescription for clonazepam at 1 twice a day.  He has that one to take to walk cornea.  The 9 pill prescription will be sent to [facility] in [place].  Follow-up will be in June.

## 2018-03-05 NOTE — TELEPHONE ENCOUNTER
Patient called stating [facility] in [place] won't fill this Rx as it is an early fill. Pharmacy was called, they just filled this Rx. Will close this encounter.

## 2018-03-06 ASSESSMENT — ANXIETY QUESTIONNAIRES: GAD7 TOTAL SCORE: 1

## 2018-03-08 NOTE — TELEPHONE ENCOUNTER
"Patient called back, said he was owed 4 mg of klonopin.     The writer attempted to clarify what the patient was requesting.     While reading and stating what was shown on his medication list, the writer stated that the record shows that there was a prescription written for klonopin 1 mg for 9 tablets that looks like it should last him 4 days.     The caller paused and then stated \"don't tell me what should last me how long, that shit is going to get me angry\"     Writer stated that she was not going to tolerate being sworn at, nor the caller raising his voice at her.     The call was ended before really figuring out what the patient was requesting.   "

## 2018-03-09 RX ORDER — CLONAZEPAM 2 MG/1
2 TABLET ORAL 2 TIMES DAILY PRN
Qty: 4 TABLET | Refills: 0 | Status: SHIPPED | OUTPATIENT
Start: 2018-03-09 | End: 2018-06-04

## 2018-03-09 NOTE — TELEPHONE ENCOUNTER
I will do the Rx for just the 4 tablets that he was shorted  He needs to see Dr Watson for further refill  Rx approved, printed given to Team 2 staff to be faxed or called

## 2018-03-09 NOTE — TELEPHONE ENCOUNTER
Patient called requesting 4 tablets of clonazepam 2 mg. Reports he was only given 5 tablets of the 9 tabs ordered by PCP b/c [facility] did not have [facility] brand. [facility] pharmacy was called and  was reviewed. Verified information above -pt filled 5 tablets of [employer]ylan brand 03/05/2018. Pharmacy only had 5 tablets of [employer] brand  and informed pt he filled, he would forfeit remaining 4 tablets. Pt filled script.. Pt does have future refill but insist on getting 4 missing tablets. Pharmacy will need a new script with [employer] brand. Please advice. Pt asked request be sent to providers at clinic today. Please advice.

## 2018-03-09 NOTE — TELEPHONE ENCOUNTER
Called pt and informed him Rx was approved. He would like Rx faxed to [facility] pharmacy. Please fax script.

## 2018-03-09 NOTE — TELEPHONE ENCOUNTER
Prescription was called in verbally to [facility] in [facility]. Given to pharmacist on duty.    Phone number called was   [phone]    03/09/18  1:59 PM    The pharmacist is wondering if it is okay for the patient to pay cash for these tablets. Huddle with provider who said it was okay.

## 2018-03-23 DIAGNOSIS — F41.1 GENERALIZED ANXIETY DISORDER: ICD-10-CM

## 2018-03-23 RX ORDER — GABAPENTIN 600 MG/1
TABLET ORAL
Qty: 150 TABLET | Refills: 5 | Status: SHIPPED | OUTPATIENT
Start: 2018-03-23 | End: 2018-08-29

## 2018-04-27 ENCOUNTER — TRANSFERRED RECORDS (OUTPATIENT)
Dept: HEALTH INFORMATION MANAGEMENT | Facility: CLINIC | Age: 33
End: 2018-04-27

## 2018-04-29 ENCOUNTER — TRANSFERRED RECORDS (OUTPATIENT)
Dept: HEALTH INFORMATION MANAGEMENT | Facility: CLINIC | Age: 33
End: 2018-04-29

## 2018-05-10 ENCOUNTER — TRANSFERRED RECORDS (OUTPATIENT)
Dept: HEALTH INFORMATION MANAGEMENT | Facility: CLINIC | Age: 33
End: 2018-05-10

## 2018-05-16 ENCOUNTER — TRANSFERRED RECORDS (OUTPATIENT)
Dept: HEALTH INFORMATION MANAGEMENT | Facility: CLINIC | Age: 33
End: 2018-05-16

## 2018-05-28 ENCOUNTER — TRANSFERRED RECORDS (OUTPATIENT)
Dept: HEALTH INFORMATION MANAGEMENT | Facility: CLINIC | Age: 33
End: 2018-05-28

## 2018-06-04 ENCOUNTER — OFFICE VISIT (OUTPATIENT)
Dept: FAMILY MEDICINE | Facility: CLINIC | Age: 33
End: 2018-06-04
Payer: COMMERCIAL

## 2018-06-04 VITALS
HEIGHT: 73 IN | RESPIRATION RATE: 16 BRPM | SYSTOLIC BLOOD PRESSURE: 120 MMHG | WEIGHT: 141 LBS | DIASTOLIC BLOOD PRESSURE: 84 MMHG | BODY MASS INDEX: 18.69 KG/M2 | TEMPERATURE: 97.3 F | HEART RATE: 66 BPM

## 2018-06-04 DIAGNOSIS — F41.1 GENERALIZED ANXIETY DISORDER: ICD-10-CM

## 2018-06-04 PROCEDURE — 99213 OFFICE O/P EST LOW 20 MIN: CPT | Performed by: FAMILY MEDICINE

## 2018-06-04 RX ORDER — CLONAZEPAM 2 MG/1
2 TABLET ORAL
Qty: 180 TABLET | Refills: 0 | Status: SHIPPED | OUTPATIENT
Start: 2018-06-04 | End: 2018-08-29

## 2018-06-04 ASSESSMENT — ANXIETY QUESTIONNAIRES
1. FEELING NERVOUS, ANXIOUS, OR ON EDGE: SEVERAL DAYS
2. NOT BEING ABLE TO STOP OR CONTROL WORRYING: NOT AT ALL
GAD7 TOTAL SCORE: 2
5. BEING SO RESTLESS THAT IT IS HARD TO SIT STILL: NOT AT ALL
7. FEELING AFRAID AS IF SOMETHING AWFUL MIGHT HAPPEN: NOT AT ALL
3. WORRYING TOO MUCH ABOUT DIFFERENT THINGS: NOT AT ALL
IF YOU CHECKED OFF ANY PROBLEMS ON THIS QUESTIONNAIRE, HOW DIFFICULT HAVE THESE PROBLEMS MADE IT FOR YOU TO DO YOUR WORK, TAKE CARE OF THINGS AT HOME, OR GET ALONG WITH OTHER PEOPLE: SOMEWHAT DIFFICULT
6. BECOMING EASILY ANNOYED OR IRRITABLE: NOT AT ALL

## 2018-06-04 ASSESSMENT — PATIENT HEALTH QUESTIONNAIRE - PHQ9: 5. POOR APPETITE OR OVEREATING: SEVERAL DAYS

## 2018-06-04 NOTE — PROGRESS NOTES
"  SUBJECTIVE:   Ramon Jamison is a 33 year old male who presents to clinic today for the following health issues:      Anxiety Follow-Up    Status since last visit: Worsened     Other associated symptoms:None    Complicating factors:   Significant life event: wife is due for baby in 1 week   Current substance abuse: None  Depression symptoms: No  TAMMY-7 SCORE 11/8/2017 3/5/2018 6/4/2018   Total Score - 1 (minimal anxiety) -   Total Score 3 1 2       TAMMY-7    Amount of exercise or physical activity: None    Problems taking medications regularly: No    Medication side effects: none    Diet: regular (no restrictions)            Problem list and histories reviewed & adjusted, as indicated.  Additional history: as documented    Patient Active Problem List   Diagnosis     Generalized anxiety disorder     Social phobia     Tobacco use disorder     Attention deficit hyperactivity disorder (ADHD)     Migraine without status migrainosus, not intractable     Past Surgical History:   Procedure Laterality Date     NO HISTORY OF SURGERY         Social History   Substance Use Topics     Smoking status: Current Every Day Smoker     Packs/day: 0.50     Years: 7.00     Types: Cigarettes     Smokeless tobacco: Never Used     Alcohol use 0.0 oz/week     0 Standard drinks or equivalent per week      Comment: once monthly     Family History   Problem Relation Age of Onset     Unknown/Adopted Mother      Unknown/Adopted Father            Reviewed and updated as needed this visit by clinical staff  Tobacco  Allergies  Meds  Med Hx  Surg Hx  Fam Hx  Soc Hx      Reviewed and updated as needed this visit by Provider         ROS:  Constitutional, HEENT, cardiovascular, pulmonary, gi and gu systems are negative, except as otherwise noted.    OBJECTIVE:                                                    /84 (Cuff Size: Adult Regular)  Pulse 66  Temp 97.3  F (36.3  C) (Tympanic)  Resp 16  Ht 6' 1\" (1.854 m)  Wt 141 lb (64 kg)  " BMI 18.6 kg/m2  Body mass index is 18.6 kg/(m^2).  GENERAL APPEARANCE: healthy, alert and no distress         ASSESSMENT/PLAN:                                                        ICD-10-CM    1. Generalized anxiety disorder F41.1 sertraline (ZOLOFT) 50 MG tablet     clonazePAM (KLONOPIN) 2 MG tablet       Patient Instructions   I refilled the patient's sertraline and his clonazepam.  He will follow-up with me in 3 months sooner as needed.  I declined his request for a short acting benzodiazepine to take around the delivery of his next child.      eYyo Watson MD  [facility]

## 2018-06-04 NOTE — MR AVS SNAPSHOT
"              After Visit Summary   6/4/2018    Ramon Jamison    MRN: [id number]           Patient Information     Date Of Birth          1985        Visit Information        Provider Department      6/4/2018 1:30 PM Yeyo Watson MD [facility]        Today's Diagnoses     Generalized anxiety disorder          Care Instructions    I refilled the patient's sertraline and his clonazepam.  He will follow-up with me in 3 months sooner as needed.  I declined his request for a short acting benzodiazepine to take around the delivery of his next child.          Follow-ups after your visit        Your next 10 appointments already scheduled     Sep 10, 2018  1:00 PM CDT   SHORT with Yeyo Watson MD   [facility] ([facility])    [address] [place] [address] [id number] [phone]              Who to contact     If you have questions or need follow up information about today's clinic visit or your schedule please contact [facility] directly at [phone].  Normal or non-critical lab and imaging results will be communicated to you by IQ Elitehart, letter or phone within 4 business days after the clinic has received the results. If you do not hear from us within 7 days, please contact the clinic through IQ Elitehart or phone. If you have a critical or abnormal lab result, we will notify you by phone as soon as possible.  Submit refill requests through Must See [employer] or call your pharmacy and they will forward the refill request to us. Please allow 3 business days for your refill to be completed.          Additional Information About Your Visit        MyChart Information     Must See [employer] lets you send messages to your doctor, view your test results, renew your prescriptions, schedule appointments and more. To sign up, go to [email or web address] . Click on \"Log in\" on the left side " "of the screen, which will take you to the Welcome page. Then click on \"Sign up Now\" on the right side of the page.     You will be asked to enter the access code listed below, as well as some personal information. Please follow the directions to create your username and password.     Your access code is: [email or web address]  Expires: 2018  1:35 PM     Your access code will  in 90 days. If you need help or a new code, please call your [facility] or [phone].        Care EveryWhere ID     This is your Care EveryWhere ID. This could be used by other organizations to access your [place] medical records  [id number]        Your Vitals Were     Pulse Temperature Respirations Height BMI (Body Mass Index)       66 97.3  F (36.3  C) (Tympanic) 16 6' 1\" (1.854 m) 18.6 kg/m2        Blood Pressure from Last 3 Encounters:   18 120/84   18 118/70   17 120/70    Weight from Last 3 Encounters:   18 141 lb (64 kg)   18 146 lb (66.2 kg)   17 147 lb (66.7 kg)              Today, you had the following     No orders found for display         Today's Medication Changes          These changes are accurate as of 18  2:06 PM.  If you have any questions, ask your nurse or doctor.               These medicines have changed or have updated prescriptions.        Dose/Directions    clonazePAM 2 MG tablet   Commonly known as:  klonoPIN   This may have changed:  Another medication with the same name was removed. Continue taking this medication, and follow the directions you see here.   Used for:  Generalized anxiety disorder   Changed by:  Yeyo Watson MD        Dose:  2 mg   Take 1 tablet (2 mg) by mouth 2 times daily   Quantity:  180 tablet   Refills:  0            Where to get your medicines      These medications were sent to [id number] - [place] - [address] AT [facility] OF  [address] [address]     Phone:  [phone]     " sertraline 50 MG tablet         Some of these will need a paper prescription and others can be bought over the counter.  Ask your nurse if you have questions.     Bring a paper prescription for each of these medications     clonazePAM 2 MG tablet                Primary Care Provider Office Phone # Fax #    Yeyo Watson [phone] [address] [facility] [address]        Equal Access to Services     ARELI OKEEFE : Hadii aad ku hadasho Soomaali, waaxda luqadaha, qaybta kaalmada adeegyada, waxay idiin hayaan adeeg khalexandersh la'kenn ah. So [facility] [phone].    ATENCIÓN: Si rafael guevara, tiene a hart disposición servicios gratuitos de asistencia lingüística. Llame al [phone].    We comply with applicable federal civil rights laws and [place] laws. We do not discriminate on the basis of race, color, national origin, age, disability, sex, sexual orientation, or gender identity.            Thank you!     [facility]  [facility]. Our goal is always to provide you with excellent care. Hearing back from our patients is one way we can continue to improve our services. Please take a few minutes to complete the written survey that you may receive in the mail after your visit with us. Thank you!             Your Updated Medication List - Protect others around you: Learn how to safely use, store and throw away your medicines at www.disposemymeds.org.          This list is accurate as of 6/4/18  2:06 PM.  Always use your most recent med list.                   Brand Name Dispense Instructions for use Diagnosis    clonazePAM 2 MG tablet    klonoPIN    180 tablet    Take 1 tablet (2 mg) by mouth 2 times daily    Generalized anxiety disorder       gabapentin 600 MG tablet    NEURONTIN    150 tablet    TAKE 2 TABLETS BY MOUTH IN THE AM AND 3 TABLETS AT NIGHT    Generalized anxiety disorder       sertraline 50 MG tablet    ZOLOFT    45  tablet    TAKE ONE AND ONE-HALF TABLETS BY MOUTH EVERY DAY    Generalized anxiety disorder

## 2018-06-04 NOTE — PATIENT INSTRUCTIONS
I refilled the patient's sertraline and his clonazepam.  He will follow-up with me in 3 months sooner as needed.  I declined his request for a short acting benzodiazepine to take around the delivery of his next child.

## 2018-06-05 ASSESSMENT — ANXIETY QUESTIONNAIRES: GAD7 TOTAL SCORE: 2

## 2018-08-02 NOTE — MR AVS SNAPSHOT
"              After Visit Summary   4/4/2017    Ramon Jamison    MRN: [id number]           Patient Information     Date Of Birth          1985        Visit Information        Provider Department      4/4/2017 11:45 AM Yeyo Watson MD [facility]        Today's Diagnoses     Generalized anxiety disorder          Care Instructions    Will see back in July. Clonazepam was refilled and dated for 4/15/2017.        Follow-ups after your visit        Follow-up notes from your care team     Return in about 3 months (around 7/4/2017) for Routine Visit.      Your next 10 appointments already scheduled     Jul 19, 2017  2:00 PM CDT   SHORT with Yeyo Watson MD   [facility]    [address] [place] [id number] [phone]              Who to contact     If you have questions or need follow up information about today's clinic visit or your schedule please contact [facility] directly at [phone].  Normal or non-critical lab and imaging results will be communicated to you by Startup Villagehart, letter or phone within 4 business days after the clinic has received the results. If you do not hear from us within 7 days, please contact the clinic through Startup Villagehart or phone. If you have a critical or abnormal lab result, we will notify you by phone as soon as possible.  Submit refill requests through Dr[employer] or call your pharmacy and they will forward the refill request to us. Please allow 3 business days for your refill to be completed.          Additional Information About Your Visit        MyChart Information     DrinkWiser lets you send messages to your doctor, view your test results, renew your prescriptions, schedule appointments and more. To sign up, go to [email or web address] . Click on \"Log in\" on the left side of the screen, which will take you to " Pt ambulatory in ED c/o headache, reports went to urgent care and sent to ED for hypertension and further eval. "the Welcome page. Then click on \"Sign up Now\" on the right side of the page.     You will be asked to enter the access code listed below, as well as some personal information. Please follow the directions to create your username and password.     Your access code is: [email or web address]  Expires: 2017  2:53 PM     Your access code will  in 90 days. If you need help or a new code, please call your [place] clinic or [phone].        Care EveryWhere ID     This is your Care EveryWhere ID. This could be used by other organizations to access your [place] medical records  [id number]        Your Vitals Were     Pulse Temperature Respirations Height BMI (Body Mass Index)       66 98  F (36.7  C) (Tympanic) 16 6' 1\" (1.854 m) 19.26 kg/m2        Blood Pressure from Last 3 Encounters:   17 120/70   17 122/70   16 116/78    Weight from Last 3 Encounters:   17 146 lb (66.2 kg)   17 141 lb (64 kg)   16 143 lb (64.9 kg)              Today, you had the following     No orders found for display         Today's Medication Changes          These changes are accurate as of: 17  5:00 PM.  If you have any questions, ask your nurse or doctor.               These medicines have changed or have updated prescriptions.        Dose/Directions    clonazePAM 1 MG tablet   Commonly known as:  klonoPIN   This may have changed:    - how much to take  - how to take this  - when to take this  - additional instructions   Used for:  Generalized anxiety disorder   Changed by:  Yeyo Watson MD        Dose:  1 mg   Start taking on:  4/15/2017   Take 1 tablet (1 mg) by mouth 2 times daily   Quantity:  60 tablet   Refills:  3       sertraline 50 MG tablet   Commonly known as:  ZOLOFT   This may have changed:  additional instructions   Used for:  Generalized anxiety disorder   Changed by:  Yeyo Watson MD        Dose:  75 mg   Take 1.5 tablets (75 mg) by mouth daily   Quantity:  45 " tablet   Refills:  3            Where to get your medicines      These medications were sent to [facility] - [place] - [address] AT [facility] OF [place] [address]  [address], [place] [address]    Hours:  24-hours Phone:  [phone]     gabapentin 600 MG tablet         Some of these will need a paper prescription and others can be bought over the counter.  Ask your nurse if you have questions.     Bring a paper prescription for each of these medications     clonazePAM 1 MG tablet       You don't need a prescription for these medications     sertraline 50 MG tablet                Primary Care Provider Office Phone # Fax #    Yeyo Watson [phone]       [facility] [place] [address] [facility] [address]        Thank you!     Thank you for choosing [facility]  for your care. Our goal is always to provide you with excellent care. Hearing back from our patients is one way we can continue to improve our services. Please take a few minutes to complete the written survey that you may receive in the mail after your visit with us. Thank you!             Your Updated Medication List - Protect others around you: Learn how to safely use, store and throw away your medicines at www.disposemymeds.org.          This list is accurate as of: 4/4/17  5:00 PM.  Always use your most recent med list.                   Brand Name Dispense Instructions for use    clonazePAM 1 MG tablet   Start taking on:  4/15/2017    klonoPIN    60 tablet    Take 1 tablet (1 mg) by mouth 2 times daily       gabapentin 600 MG tablet    NEURONTIN    120 tablet    Take 2 tablets twice daily       sertraline 50 MG tablet    ZOLOFT    45 tablet    Take 1.5 tablets (75 mg) by mouth daily

## 2018-08-29 ENCOUNTER — OFFICE VISIT (OUTPATIENT)
Dept: FAMILY MEDICINE | Facility: CLINIC | Age: 33
End: 2018-08-29
Payer: COMMERCIAL

## 2018-08-29 VITALS
SYSTOLIC BLOOD PRESSURE: 140 MMHG | WEIGHT: 138 LBS | DIASTOLIC BLOOD PRESSURE: 78 MMHG | BODY MASS INDEX: 18.21 KG/M2 | HEART RATE: 75 BPM | RESPIRATION RATE: 14 BRPM | OXYGEN SATURATION: 98 % | TEMPERATURE: 96.6 F

## 2018-08-29 DIAGNOSIS — F41.1 GENERALIZED ANXIETY DISORDER: ICD-10-CM

## 2018-08-29 PROCEDURE — 99213 OFFICE O/P EST LOW 20 MIN: CPT | Performed by: FAMILY MEDICINE

## 2018-08-29 RX ORDER — GABAPENTIN 600 MG/1
TABLET ORAL
Qty: 150 TABLET | Refills: 5 | Status: SHIPPED | OUTPATIENT
Start: 2018-08-29 | End: 2018-12-17

## 2018-08-29 RX ORDER — CLONAZEPAM 2 MG/1
2 TABLET ORAL
Qty: 180 TABLET | Refills: 0 | Status: SHIPPED | OUTPATIENT
Start: 2018-08-29 | End: 2018-11-05

## 2018-08-29 ASSESSMENT — ANXIETY QUESTIONNAIRES
GAD7 TOTAL SCORE: 0
GAD7 TOTAL SCORE: 0
1. FEELING NERVOUS, ANXIOUS, OR ON EDGE: NOT AT ALL
7. FEELING AFRAID AS IF SOMETHING AWFUL MIGHT HAPPEN: NOT AT ALL
2. NOT BEING ABLE TO STOP OR CONTROL WORRYING: NOT AT ALL
7. FEELING AFRAID AS IF SOMETHING AWFUL MIGHT HAPPEN: NOT AT ALL
3. WORRYING TOO MUCH ABOUT DIFFERENT THINGS: NOT AT ALL
5. BEING SO RESTLESS THAT IT IS HARD TO SIT STILL: NOT AT ALL
6. BECOMING EASILY ANNOYED OR IRRITABLE: NOT AT ALL
GAD7 TOTAL SCORE: 0
4. TROUBLE RELAXING: NOT AT ALL

## 2018-08-29 ASSESSMENT — PATIENT HEALTH QUESTIONNAIRE - PHQ9
SUM OF ALL RESPONSES TO PHQ QUESTIONS 1-9: 0
SUM OF ALL RESPONSES TO PHQ QUESTIONS 1-9: 0

## 2018-08-29 NOTE — PROGRESS NOTES
SUBJECTIVE:   Ramon Jamison is a 33 year old male who presents to clinic today for the following health issues:      Anxiety Follow-Up    Status since last visit: Improved     Other associated symptoms:None    Complicating factors:   Significant life event: No   Current substance abuse: None  Depression symptoms: No  TAMMY-7 SCORE 3/5/2018 6/4/2018 8/29/2018   Total Score 1 (minimal anxiety) - 0 (minimal anxiety)   Total Score 1 2 0       TAMMY-7    Amount of exercise or physical activity: 4-5 days/week for an average of 30-45 minutes    Problems taking medications regularly: No    Medication side effects: none    Diet: regular (no restrictions)            Problem list and histories reviewed & adjusted, as indicated.  Additional history: as documented    Patient Active Problem List   Diagnosis     Generalized anxiety disorder     Social phobia     Tobacco use disorder     Attention deficit hyperactivity disorder (ADHD)     Migraine without status migrainosus, not intractable     Past Surgical History:   Procedure Laterality Date     NO HISTORY OF SURGERY         Social History   Substance Use Topics     Smoking status: Current Every Day Smoker     Packs/day: 0.50     Years: 7.00     Types: Cigarettes     Smokeless tobacco: Never Used     Alcohol use 0.0 oz/week     0 Standard drinks or equivalent per week      Comment: once monthly     Family History   Problem Relation Age of Onset     Unknown/Adopted Mother      Unknown/Adopted Father            Reviewed and updated as needed this visit by clinical staff  Tobacco  Allergies  Meds  Med Hx  Surg Hx  Fam Hx  Soc Hx      Reviewed and updated as needed this visit by Provider         ROS:  Constitutional, HEENT, cardiovascular, pulmonary, gi and gu systems are negative, except as otherwise noted.    OBJECTIVE:                                                    /78  Pulse 75  Temp 96.6  F (35.9  C) (Tympanic)  Resp 14  Wt 138 lb (62.6 kg)  SpO2 98%  BMI  18.21 kg/m2  Body mass index is 18.21 kg/(m^2).  GENERAL APPEARANCE: healthy, alert and no distress         ASSESSMENT/PLAN:                                                        ICD-10-CM    1. Generalized anxiety disorder F41.1 clonazePAM (KLONOPIN) 2 MG tablet     gabapentin (NEURONTIN) 600 MG tablet       Follow up with Provider -3 months  Patient Instructions   I refilled the patient's gabapentin and clonazepam.  Follow-up in 3 months sooner as needed.      Yeyo Watson MD  [facility]  Answers for HPI/ROS submitted by the patient on 8/29/2018   PHQ9 TOTAL SCORE: 0  TAMMY 7 TOTAL SCORE: 0

## 2018-08-29 NOTE — MR AVS SNAPSHOT
"              After Visit Summary   8/29/2018    Ramon Jamison    MRN: [id number]           Patient Information     Date Of Birth          1985        Visit Information        Provider Department      8/29/2018 10:45 AM Yeyo Watson MD [facility]        Today's Diagnoses     Generalized anxiety disorder          Care Instructions    I refilled the patient's gabapentin and clonazepam.  Follow-up in 3 months sooner as needed.          Follow-ups after your visit        Follow-up notes from your care team     Return in about 3 months (around 11/29/2018) for Routine Visit.      Who to contact     If you have questions or need follow up information about today's clinic visit or your schedule please contact [facility] directly at [phone].  Normal or non-critical lab and imaging results will be communicated to you by MyChart, letter or phone within 4 business days after the clinic has received the results. If you do not hear from us within 7 days, please contact the clinic through MyChart or phone. If you have a critical or abnormal lab result, we will notify you by phone as soon as possible.  Submit refill requests through [employer] or call your pharmacy and they will forward the refill request to us. Please allow 3 business days for your refill to be completed.          Additional Information About Your Visit        MyChart Information     [employer] lets you send messages to your doctor, view your test results, renew your prescriptions, schedule appointments and more. To sign up, go to [email or web address] . Click on \"Log in\" on the left side of the screen, which will take you to the Welcome page. Then click on \"Sign up Now\" on the right side of the page.     You will be asked to enter the access code listed below, as well as some personal information. Please follow the directions to create your username and password.     Your access code is: " [email or web address]  Expires: 2018 10:54 AM     Your access code will  in 90 days. If you need help or a new code, please call your [place] clinic or [phone].        Care EveryWhere ID     This is your Care EveryWhere ID. This could be used by other organizations to access your [place] medical records  [id number]        Your Vitals Were     Pulse Temperature Respirations Pulse Oximetry BMI (Body Mass Index)       75 96.6  F (35.9  C) (Tympanic) 14 98% 18.21 kg/m2        Blood Pressure from Last 3 Encounters:   18 140/78   18 120/84   18 118/70    Weight from Last 3 Encounters:   18 138 lb (62.6 kg)   18 141 lb (64 kg)   18 146 lb (66.2 kg)              Today, you had the following     No orders found for display         Where to get your medicines      These medications were sent to [facility] - [place] - [address]  AT [facility] OF  [address]  [address] [place] [address]     Phone:  [phone]     gabapentin 600 MG tablet         Some of these will need a paper prescription and others can be bought over the counter.  Ask your nurse if you have questions.     Bring a paper prescription for each of these medications     clonazePAM 2 MG tablet          Primary Care Provider Office Phone # Fax #    Yeyo Tye Watson [phone]       [address] [facility] [address]        Equal Access to Services     EVERARDO OKEEFE AH: Hadii aad ku hadasho Soomaali, waaxda luqadaha, qaybta kaalmada maritza, jessica idijaime fontenot. So [facility] [phone].    ATENCIÓN: Si habla español, tiene a hart disposición servicios gratuitos de asistencia lingüística. Llame al [phone].    We comply with applicable federal civil rights laws and [place] laws. We do not discriminate on the basis of race, color, national origin, age, disability, sex, sexual orientation, or gender identity.            Thank you!     Thank you  for choosing [facility]  for your care. Our goal is always to provide you with excellent care. Hearing back from our patients is one way we can continue to improve our services. Please take a few minutes to complete the written survey that you may receive in the mail after your visit with us. Thank you!             Your Updated Medication List - Protect others around you: Learn how to safely use, store and throw away your medicines at www.disposemymeds.org.          This list is accurate as of 8/29/18 12:05 PM.  Always use your most recent med list.                   Brand Name Dispense Instructions for use Diagnosis    clonazePAM 2 MG tablet    klonoPIN    180 tablet    Take 1 tablet (2 mg) by mouth 2 times daily    Generalized anxiety disorder       gabapentin 600 MG tablet    NEURONTIN    150 tablet    TAKE 2 TABLETS BY MOUTH IN THE AM AND 3 TABLETS AT NIGHT    Generalized anxiety disorder       sertraline 50 MG tablet    ZOLOFT    45 tablet    TAKE ONE AND ONE-HALF TABLETS BY MOUTH EVERY DAY    Generalized anxiety disorder

## 2018-08-30 ASSESSMENT — ANXIETY QUESTIONNAIRES: GAD7 TOTAL SCORE: 0

## 2018-08-30 ASSESSMENT — PATIENT HEALTH QUESTIONNAIRE - PHQ9: SUM OF ALL RESPONSES TO PHQ QUESTIONS 1-9: 0

## 2018-11-05 ENCOUNTER — OFFICE VISIT (OUTPATIENT)
Dept: FAMILY MEDICINE | Facility: CLINIC | Age: 33
End: 2018-11-05
Payer: COMMERCIAL

## 2018-11-05 VITALS
DIASTOLIC BLOOD PRESSURE: 90 MMHG | WEIGHT: 142 LBS | BODY MASS INDEX: 18.73 KG/M2 | OXYGEN SATURATION: 98 % | HEART RATE: 63 BPM | TEMPERATURE: 98.1 F | SYSTOLIC BLOOD PRESSURE: 140 MMHG

## 2018-11-05 DIAGNOSIS — F40.10 SOCIAL PHOBIA: ICD-10-CM

## 2018-11-05 DIAGNOSIS — R03.0 ELEVATED BLOOD PRESSURE READING WITHOUT DIAGNOSIS OF HYPERTENSION: ICD-10-CM

## 2018-11-05 DIAGNOSIS — F41.1 GENERALIZED ANXIETY DISORDER: Primary | ICD-10-CM

## 2018-11-05 PROCEDURE — 99213 OFFICE O/P EST LOW 20 MIN: CPT | Performed by: FAMILY MEDICINE

## 2018-11-05 RX ORDER — CITALOPRAM HYDROBROMIDE 20 MG/1
20 TABLET ORAL DAILY
Qty: 30 TABLET | Refills: 1 | Status: SHIPPED | OUTPATIENT
Start: 2018-11-05 | End: 2018-12-17

## 2018-11-05 RX ORDER — CLONAZEPAM 2 MG/1
2 TABLET ORAL
Qty: 60 TABLET | Refills: 0 | Status: SHIPPED | OUTPATIENT
Start: 2018-11-05 | End: 2018-11-30

## 2018-11-05 ASSESSMENT — ANXIETY QUESTIONNAIRES
5. BEING SO RESTLESS THAT IT IS HARD TO SIT STILL: NOT AT ALL
6. BECOMING EASILY ANNOYED OR IRRITABLE: NOT AT ALL
GAD7 TOTAL SCORE: 4
GAD7 TOTAL SCORE: 4
4. TROUBLE RELAXING: SEVERAL DAYS
7. FEELING AFRAID AS IF SOMETHING AWFUL MIGHT HAPPEN: NOT AT ALL
3. WORRYING TOO MUCH ABOUT DIFFERENT THINGS: SEVERAL DAYS
GAD7 TOTAL SCORE: 4
1. FEELING NERVOUS, ANXIOUS, OR ON EDGE: SEVERAL DAYS
7. FEELING AFRAID AS IF SOMETHING AWFUL MIGHT HAPPEN: NOT AT ALL
2. NOT BEING ABLE TO STOP OR CONTROL WORRYING: SEVERAL DAYS

## 2018-11-05 ASSESSMENT — PATIENT HEALTH QUESTIONNAIRE - PHQ9
SUM OF ALL RESPONSES TO PHQ QUESTIONS 1-9: 1
SUM OF ALL RESPONSES TO PHQ QUESTIONS 1-9: 1

## 2018-11-05 NOTE — PATIENT INSTRUCTIONS
I will change the patient from sertraline to citalopram.  I also refilled his gabapentin.  Refill on his clonazepam was given.  He will follow-up in 1 month sooner as needed.

## 2018-11-05 NOTE — PROGRESS NOTES
SUBJECTIVE:   Ramon Jamison is a 33 year old male who presents to clinic today for the following health issues:      Anxiety Follow-Up    Status since last visit: No change    Other associated symptoms:None    Complicating factors:   Significant life event: No   Current substance abuse: None  Depression symptoms: No  TAMMY-7 SCORE 6/4/2018 8/29/2018 11/5/2018   Total Score - 0 (minimal anxiety) 4 (minimal anxiety)   Total Score 2 0 4       TAMMY-7    Amount of exercise or physical activity: 6-7 days/week for an average of 45-60 minutes    Problems taking medications regularly: No    Medication side effects: none    Diet: regular (no restrictions)            Problem list and histories reviewed & adjusted, as indicated.  Additional history: as documented    Patient Active Problem List   Diagnosis     Generalized anxiety disorder     Social phobia     Tobacco use disorder     Attention deficit hyperactivity disorder (ADHD)     Migraine without status migrainosus, not intractable     Elevated blood pressure reading without diagnosis of hypertension     Past Surgical History:   Procedure Laterality Date     NO HISTORY OF SURGERY         Social History   Substance Use Topics     Smoking status: Current Every Day Smoker     Packs/day: 0.50     Years: 7.00     Types: Cigarettes     Smokeless tobacco: Never Used     Alcohol use 0.0 oz/week     0 Standard drinks or equivalent per week      Comment: once monthly     Family History   Problem Relation Age of Onset     Unknown/Adopted Mother      Unknown/Adopted Father            Reviewed and updated as needed this visit by clinical staff  Tobacco  Allergies  Meds  Med Hx  Surg Hx  Fam Hx  Soc Hx      Reviewed and updated as needed this visit by Provider         ROS:  Constitutional, HEENT, cardiovascular, pulmonary, gi and gu systems are negative, except as otherwise noted.    OBJECTIVE:                                                    /90 (Cuff Size: Adult Large)   Pulse 63  Temp 98.1  F (36.7  C) (Tympanic)  Wt 142 lb (64.4 kg)  SpO2 98%  BMI 18.73 kg/m2  Body mass index is 18.73 kg/(m^2).  GENERAL APPEARANCE: healthy, alert and no distress       ASSESSMENT/PLAN:                                                        ICD-10-CM    1. Generalized anxiety disorder F41.1 citalopram (CELEXA) 20 MG tablet     clonazePAM (KLONOPIN) 2 MG tablet   2. Social phobia F40.10 citalopram (CELEXA) 20 MG tablet   3. Elevated blood pressure reading without diagnosis of hypertension R03.0      Return in about 4 weeks (around 12/3/2018) for anxiety.  Patient Instructions   I will change the patient from sertraline to citalopram.  I also refilled his gabapentin.  Refill on his clonazepam was given.  He will follow-up in 1 month sooner as needed.      Yeyo Watson MD  [facility]    Answers for HPI/ROS submitted by the patient on 11/5/2018   TAMMY 7 TOTAL SCORE: 4  PHQ9 TOTAL SCORE: 1

## 2018-11-05 NOTE — MR AVS SNAPSHOT
After Visit Summary   11/5/2018    Ramon Jamison    MRN: [id number]           Patient Information     Date Of Birth          1985        Visit Information        Provider Department      11/5/2018 10:15 AM Yeyo Watson MD [facility]        Today's Diagnoses     Generalized anxiety disorder    -  1    Social phobia          Care Instructions    I will change the patient from sertraline to citalopram.  I also refilled his gabapentin.  Refill on his clonazepam was given.  He will follow-up in 1 month sooner as needed.          Follow-ups after your visit        Follow-up notes from your care team     Return in about 4 weeks (around 12/3/2018) for anxiety.      Your next 10 appointments already scheduled     Dec 03, 2018  1:00 PM CST   [facility]    [address] [place] [id number]   [phone]              Who to contact     If you have questions or need follow up information about today's clinic visit or your schedule please contact [facility] directly at [phone].  Normal or non-critical lab and imaging results will be communicated to you by MyChart, letter or phone within 4 business days after the clinic has received the results. If you do not hear from us within 7 days, please contact the clinic through MyChart or phone. If you have a critical or abnormal lab result, we will notify you by phone as soon as possible.  Submit refill requests through Sp[employer] or call your pharmacy and they will forward the refill request to us. Please allow 3 business days for your refill to be completed.          Additional Information About Your Visit        [employer] Information     Spacious lets you send messages to your doctor, view your test results, renew your prescriptions, schedule appointments and  "more. To sign up, go to [email or web address] . Click on \"Log in\" on the left side of the screen, which will take you to the Welcome page. Then click on \"Sign up Now\" on the right side of the page.     You will be asked to enter the access code listed below, as well as some personal information. Please follow the directions to create your username and password.     Your access code is: [email or web address]  Expires: 2018  9:54 AM     Your access code will  in 90 days. If you need help or a new code, please call your [facility] clinic or [phone].        Care EveryWhere ID     This is your Care EveryWhere ID. This could be used by other organizations to access your [facility] medical records  [id number]        Your Vitals Were     Pulse Temperature Pulse Oximetry BMI (Body Mass Index)          63 98.1  F (36.7  C) (Tympanic) 98% 18.73 kg/m2         Blood Pressure from Last 3 Encounters:   18 140/90   18 140/78   18 120/84    Weight from Last 3 Encounters:   18 142 lb (64.4 kg)   18 138 lb (62.6 kg)   18 141 lb (64 kg)              Today, you had the following     No orders found for display         Today's Medication Changes          These changes are accurate as of 18  2:10 PM.  If you have any questions, ask your nurse or doctor.               Start taking these medicines.        Dose/Directions    citalopram 20 MG tablet   Commonly known as:  celeXA   Used for:  Generalized anxiety disorder, Social phobia   Started by:  Yeyo Watson MD        Dose:  20 mg   Take 1 tablet (20 mg) by mouth daily   Quantity:  30 tablet   Refills:  1         Stop taking these medicines if you haven't already. Please contact your care team if you have questions.     sertraline 50 MG tablet   Commonly known as:  ZOLOFT   Stopped by:  Yeyo Watson MD                Where to get your medicines      These medications were sent to [facility] [address] [place] [address] " - [address] AT [facility] OF  [address]  [address] [place] [address]     Phone:  [phone]     citalopram 20 MG tablet         Some of these will need a paper prescription and others can be bought over the counter.  Ask your nurse if you have questions.     Bring a paper prescription for each of these medications     clonazePAM 2 MG tablet                Primary Care Provider Office Phone # Fax #    Yeyo Watson [phone] [address] [facility] [address]        Equal Access to Services     [facility] : Hadii aad ku hadasho Soomaali, waaxda luqadaha, qaybta kaalmada adeegyada, waxcolleen gandhi hayaan adeeg jess silveira . So [facility] [place] [phone].    ATENCIÓN: Si habla español, tiene a hart disposición servicios gratuitos de asistencia lingüística. [facility] [phone].    We comply with applicable federal civil rights laws and [place] laws. We do not discriminate on the basis of race, color, national origin, age, disability, sex, sexual orientation, or gender identity.            Thank you!     Thank you for choosing [facility]  for your care. Our goal is always to provide you with excellent care. Hearing back from our patients is one way we can continue to improve our services. Please take a few minutes to complete the written survey that you may receive in the mail after your visit with us. Thank you!             Your Updated Medication List - Protect others around you: Learn how to safely use, store and throw away your medicines at www.disposemymeds.org.          This list is accurate as of 11/5/18  2:10 PM.  Always use your most recent med list.                   Brand Name Dispense Instructions for use Diagnosis    citalopram 20 MG tablet    celeXA    30 tablet    Take 1 tablet (20 mg) by mouth daily    Generalized anxiety disorder, Social phobia       clonazePAM 2 MG tablet    klonoPIN    60 tablet    Take 1 tablet (2 mg) by mouth  2 times daily    Generalized anxiety disorder       gabapentin 600 MG tablet    NEURONTIN    150 tablet    TAKE 2 TABLETS BY MOUTH IN THE AM AND 3 TABLETS AT NIGHT    Generalized anxiety disorder

## 2018-11-06 ASSESSMENT — ANXIETY QUESTIONNAIRES: GAD7 TOTAL SCORE: 4

## 2018-11-06 ASSESSMENT — PATIENT HEALTH QUESTIONNAIRE - PHQ9: SUM OF ALL RESPONSES TO PHQ QUESTIONS 1-9: 1

## 2018-11-30 DIAGNOSIS — F41.1 GENERALIZED ANXIETY DISORDER: ICD-10-CM

## 2018-11-30 RX ORDER — CLONAZEPAM 2 MG/1
2 TABLET ORAL
Qty: 60 TABLET | Refills: 0 | Status: SHIPPED | OUTPATIENT
Start: 2018-11-30 | End: 2018-12-17

## 2018-11-30 NOTE — TELEPHONE ENCOUNTER
Medication(s): clonazepam 1 mg BID, Gabapentin 600 mg  Maximum quantity per month: 60  Clinic visit frequency required: Q 3 months     Controlled substance agreement on file: No  Benzodiazepine use reviewed by psychiatry:  No    Last [facility] website verification: 11/27/18 no concerns    Clonazepam (KLONOPIN) 2 MG   Last Written Prescription Date:  11/5/18  Last Fill Quantity: 60,   # refills: 0  Last Office Visit:11/05/18Future Office visit:    Next 5 appointments (look out 90 days)     Dec 10, 2018  1:30 PM CST   [facility]    [address] [place] [id number] [phone]                   Routing refill request to provider for review/approval because:  Drug not on the [facility], [facility] or [facility] refill protocol or controlled substance

## 2018-12-17 ENCOUNTER — OFFICE VISIT (OUTPATIENT)
Dept: FAMILY MEDICINE | Facility: CLINIC | Age: 33
End: 2018-12-17
Payer: COMMERCIAL

## 2018-12-17 VITALS
WEIGHT: 144 LBS | SYSTOLIC BLOOD PRESSURE: 132 MMHG | TEMPERATURE: 97.8 F | DIASTOLIC BLOOD PRESSURE: 80 MMHG | BODY MASS INDEX: 19 KG/M2 | OXYGEN SATURATION: 98 % | HEART RATE: 67 BPM

## 2018-12-17 DIAGNOSIS — F41.1 GENERALIZED ANXIETY DISORDER: ICD-10-CM

## 2018-12-17 DIAGNOSIS — F40.10 SOCIAL PHOBIA: ICD-10-CM

## 2018-12-17 PROCEDURE — 99213 OFFICE O/P EST LOW 20 MIN: CPT | Performed by: FAMILY MEDICINE

## 2018-12-17 RX ORDER — GABAPENTIN 600 MG/1
TABLET ORAL
Qty: 150 TABLET | Refills: 5 | Status: SHIPPED | OUTPATIENT
Start: 2018-12-17 | End: 2019-04-01

## 2018-12-17 RX ORDER — CITALOPRAM HYDROBROMIDE 20 MG/1
20 TABLET ORAL DAILY
Qty: 30 TABLET | Refills: 5 | Status: SHIPPED | OUTPATIENT
Start: 2018-12-17 | End: 2019-04-01 | Stop reason: SINTOL

## 2018-12-17 RX ORDER — CLONAZEPAM 2 MG/1
2 TABLET ORAL
Qty: 60 TABLET | Refills: 5 | Status: SHIPPED | OUTPATIENT
Start: 2018-12-30 | End: 2019-04-01

## 2018-12-17 ASSESSMENT — ANXIETY QUESTIONNAIRES
GAD7 TOTAL SCORE: 0
GAD7 TOTAL SCORE: 0
5. BEING SO RESTLESS THAT IT IS HARD TO SIT STILL: NOT AT ALL
GAD7 TOTAL SCORE: 0
7. FEELING AFRAID AS IF SOMETHING AWFUL MIGHT HAPPEN: NOT AT ALL
3. WORRYING TOO MUCH ABOUT DIFFERENT THINGS: NOT AT ALL
6. BECOMING EASILY ANNOYED OR IRRITABLE: NOT AT ALL
2. NOT BEING ABLE TO STOP OR CONTROL WORRYING: NOT AT ALL
4. TROUBLE RELAXING: NOT AT ALL
1. FEELING NERVOUS, ANXIOUS, OR ON EDGE: NOT AT ALL

## 2018-12-17 ASSESSMENT — PATIENT HEALTH QUESTIONNAIRE - PHQ9
SUM OF ALL RESPONSES TO PHQ QUESTIONS 1-9: 0
10. IF YOU CHECKED OFF ANY PROBLEMS, HOW DIFFICULT HAVE THESE PROBLEMS MADE IT FOR YOU TO DO YOUR WORK, TAKE CARE OF THINGS AT HOME, OR GET ALONG WITH OTHER PEOPLE: NOT DIFFICULT AT ALL
SUM OF ALL RESPONSES TO PHQ QUESTIONS 1-9: 0

## 2018-12-17 NOTE — PROGRESS NOTES
SUBJECTIVE:   Ramon Jamison is a 33 year old male who presents to clinic today for the following health issues:      Anxiety Follow-Up    Status since last visit: Improved     Other associated symptoms:None    Complicating factors:   Significant life event: No   Current substance abuse: None  Depression symptoms: No  TAMMY-7 SCORE 8/29/2018 11/5/2018 12/17/2018   Total Score 0 (minimal anxiety) 4 (minimal anxiety) 0 (minimal anxiety)   Total Score 0 4 0       TAMMY-7    Amount of exercise or physical activity: 6-7 days/week for an average of 30-45 minutes    Problems taking medications regularly: No    Medication side effects: none    Diet: regular (no restrictions)            Problem list and histories reviewed & adjusted, as indicated.  Additional history: as documented    Patient Active Problem List   Diagnosis     Generalized anxiety disorder     Social phobia     Tobacco use disorder     Attention deficit hyperactivity disorder (ADHD)     Migraine without status migrainosus, not intractable     Elevated blood pressure reading without diagnosis of hypertension     Past Surgical History:   Procedure Laterality Date     NO HISTORY OF SURGERY         Social History     Tobacco Use     Smoking status: Current Every Day Smoker     Packs/day: 0.50     Years: 7.00     Pack years: 3.50     Types: Cigarettes     Smokeless tobacco: Never Used   Substance Use Topics     Alcohol use: Yes     Alcohol/week: 0.0 oz     Comment: once monthly     Family History   Problem Relation Age of Onset     Unknown/Adopted Mother      Unknown/Adopted Father            Reviewed and updated as needed this visit by clinical staff  Tobacco  Allergies  Meds  Med Hx  Surg Hx  Fam Hx  Soc Hx      Reviewed and updated as needed this visit by Provider         ROS:  Constitutional, HEENT, cardiovascular, pulmonary, gi and gu systems are negative, except as otherwise noted.    OBJECTIVE:                                                    BP  132/80 (Cuff Size: Adult Regular)   Pulse 67   Temp 97.8  F (36.6  C) (Tympanic)   Wt 65.3 kg (144 lb)   SpO2 98%   BMI 19.00 kg/m    Body mass index is 19 kg/m .  GENERAL APPEARANCE: healthy, alert and no distress         ASSESSMENT/PLAN:                                                        ICD-10-CM    1. Generalized anxiety disorder F41.1 citalopram (CELEXA) 20 MG tablet     clonazePAM (KLONOPIN) 2 MG tablet     gabapentin (NEURONTIN) 600 MG tablet   2. Social phobia F40.10 citalopram (CELEXA) 20 MG tablet     Return in about 3 months (around 3/17/2019) for anxiety.  Patient Instructions   I refilled patient's medications as noted.  He will follow-up in 3 months.  He will follow-up sooner as needed.  No change of medication dosing today.  Patient is feeling much more stable with the addition of citalopram.      Yeyo Watson MD  [facility]    Answers for HPI/ROS submitted by the patient on 12/17/2018   TAMMY 7 TOTAL SCORE: 0  If you checked off any problems, how difficult have these problems made it for you to do your work, take care of things at home, or get along with other people?: Not difficult at all  PHQ9 TOTAL SCORE: 0

## 2018-12-18 ASSESSMENT — ANXIETY QUESTIONNAIRES: GAD7 TOTAL SCORE: 0

## 2018-12-18 NOTE — PATIENT INSTRUCTIONS
I refilled patient's medications as noted.  He will follow-up in 3 months.  He will follow-up sooner as needed.  No change of medication dosing today.  Patient is feeling much more stable with the addition of citalopram.

## 2018-12-20 ENCOUNTER — TRANSFERRED RECORDS (OUTPATIENT)
Dept: HEALTH INFORMATION MANAGEMENT | Facility: CLINIC | Age: 33
End: 2018-12-20

## 2019-02-03 ENCOUNTER — TRANSFERRED RECORDS (OUTPATIENT)
Dept: HEALTH INFORMATION MANAGEMENT | Facility: CLINIC | Age: 34
End: 2019-02-03

## 2019-02-04 ENCOUNTER — TELEPHONE (OUTPATIENT)
Dept: FAMILY MEDICINE | Facility: CLINIC | Age: 34
End: 2019-02-04

## 2019-02-04 ENCOUNTER — OFFICE VISIT (OUTPATIENT)
Dept: FAMILY MEDICINE | Facility: CLINIC | Age: 34
End: 2019-02-04
Payer: COMMERCIAL

## 2019-02-04 ENCOUNTER — TRANSFERRED RECORDS (OUTPATIENT)
Dept: HEALTH INFORMATION MANAGEMENT | Facility: CLINIC | Age: 34
End: 2019-02-04

## 2019-02-04 VITALS
WEIGHT: 144 LBS | HEIGHT: 73 IN | RESPIRATION RATE: 16 BRPM | SYSTOLIC BLOOD PRESSURE: 138 MMHG | BODY MASS INDEX: 19.09 KG/M2 | HEART RATE: 64 BPM | TEMPERATURE: 98 F | DIASTOLIC BLOOD PRESSURE: 88 MMHG

## 2019-02-04 DIAGNOSIS — F41.1 GENERALIZED ANXIETY DISORDER: ICD-10-CM

## 2019-02-04 DIAGNOSIS — S20.212D CONTUSION OF RIB ON LEFT SIDE, SUBSEQUENT ENCOUNTER: Primary | ICD-10-CM

## 2019-02-04 DIAGNOSIS — S60.212D CONTUSION OF LEFT WRIST, SUBSEQUENT ENCOUNTER: ICD-10-CM

## 2019-02-04 DIAGNOSIS — F17.200 TOBACCO USE DISORDER: ICD-10-CM

## 2019-02-04 DIAGNOSIS — F40.10 SOCIAL PHOBIA: ICD-10-CM

## 2019-02-04 DIAGNOSIS — S60.211D CONTUSION OF RIGHT WRIST, SUBSEQUENT ENCOUNTER: ICD-10-CM

## 2019-02-04 PROCEDURE — 99214 OFFICE O/P EST MOD 30 MIN: CPT | Performed by: FAMILY MEDICINE

## 2019-02-04 ASSESSMENT — MIFFLIN-ST. JEOR: SCORE: 1642.06

## 2019-02-04 NOTE — TELEPHONE ENCOUNTER
Nurse from [facility] called, pt is there now. Dai CHIU requesting to speak to Dr. Watson about pt's meds. Verbal and written message given to Dr. Watson - he is with a patient now and will call back when able.

## 2019-02-04 NOTE — TELEPHONE ENCOUNTER
Patient called the clinic, he was at [facility] today. Reports he fell on ice 4 times in the last 2 days. He was dx with contusion of thorax and back pain. Pt would like to talk to Dr. Watson. OV scheduled for today.

## 2019-02-04 NOTE — PROGRESS NOTES
SUBJECTIVE:   Ramon Jamison is a 34 year old male who presents to clinic today for the following health issues:      Musculoskeletal problem/pain      Duration: fell on ice on 2/2/19    Description  Location: thorax area    Intensity:  moderate    Accompanying signs and symptoms: pain and bruising    History  Previous similar problem: YES- ER- [facility]  Previous evaluation:  x-ray    Precipitating or alleviating factors:  Trauma or overuse: YES  Aggravating factors include: none    Therapies tried and outcome: heat, ice and Ibuprofen which have helped some, however, he then started to develop epigastric discomfort from the ibuprofen.  He therefore stopped that and tried taking both aspirin and Excedrin.  When he has taken Tylenol he is taken 1 or 2 325 mg acetaminophen at a time but usually only once or twice a day.  He has been seen in the emergency room twice now in the past 3 days and had x-rays of numerous areas that were injured none of which showed any fractures.  He got irritated with the emergency room at [place] this morning when they thought he was being a drug seeker and was sounding confused.  He therefore left at that point and made an appointment here to see me.          Problem list and histories reviewed & adjusted, as indicated.  Additional history: as documented    Patient Active Problem List   Diagnosis     Generalized anxiety disorder     Social phobia     Tobacco use disorder     Attention deficit hyperactivity disorder (ADHD)     Migraine without status migrainosus, not intractable     Elevated blood pressure reading without diagnosis of hypertension     Past Surgical History:   Procedure Laterality Date     NO HISTORY OF SURGERY         Social History     Tobacco Use     Smoking status: Current Every Day Smoker     Packs/day: 0.50     Years: 7.00     Pack years: 3.50     Types: Cigarettes     Smokeless tobacco: Never Used   Substance Use Topics     Alcohol use: Yes      "Alcohol/week: 0.0 oz     Comment: once monthly     Family History   Problem Relation Age of Onset     Unknown/Adopted Mother      Unknown/Adopted Father            Reviewed and updated as needed this visit by clinical staff  Tobacco  Allergies  Meds  Med Hx  Surg Hx  Fam Hx  Soc Hx      Reviewed and updated as needed this visit by Provider         ROS:  Constitutional, HEENT, cardiovascular, pulmonary, gi and gu systems are negative, except as otherwise noted.    OBJECTIVE:                                                    /88 (Cuff Size: Adult Large)   Pulse 64   Temp 98  F (36.7  C) (Tympanic)   Resp 16   Ht 1.854 m (6' 1\")   Wt 65.3 kg (144 lb)   BMI 19.00 kg/m    Body mass index is 19 kg/m .  GENERAL APPEARANCE: healthy, alert, no distress and his behavior appears to be back at baseline by my observation.  He does not appear to be confused nor to be any different than what he usually is.         ASSESSMENT/PLAN:                                                        ICD-10-CM    1. Contusion of rib on left side, subsequent encounter S20.212D    2. Contusion of left wrist, subsequent encounter S60.212D    3. Contusion of right wrist, subsequent encounter S60.211D    4. Generalized anxiety disorder F41.1    5. Social phobia F40.10    6. Tobacco use disorder F17.200        Patient Instructions   The patient appears to be back to his \"baseline\" at least by my observation.  We had a discussion about treating his contusions.  He again was wondering if he can get something in the opioid family of medications.  We discussed that with contusions treating with heat and/or ice along with 2 extra strength Tylenol 3-4 times a day should be sufficient treatment.  He is currently on clonazepam twice daily and gabapentin as noted and citalopram.  He is been having a little bit of difficulty sleeping and we talked about the fact that the clonazepam is actually in the same class of drugs as other sleep " medications.  I did not want to add to that.  We ended up with a 25-minute discussion about all of these issues and finally decided on heat and/or ice as needed.  2 extra strength Tylenol 3-4 times a day.  Continue his other prescription medications.  Follow-up will be if he is not improving.  He seemed content with the plan.      Yeyo Watson MD  [facility]

## 2019-02-04 NOTE — PATIENT INSTRUCTIONS
"The patient appears to be back to his \"baseline\" at least by my observation.  We had a discussion about treating his contusions.  He again was wondering if he can get something in the opioid family of medications.  We discussed that with contusions treating with heat and/or ice along with 2 extra strength Tylenol 3-4 times a day should be sufficient treatment.  He is currently on clonazepam twice daily and gabapentin as noted and citalopram.  He is been having a little bit of difficulty sleeping and we talked about the fact that the clonazepam is actually in the same class of drugs as other sleep medications.  I did not want to add to that.  We ended up with a 25-minute discussion about all of these issues and finally decided on heat and/or ice as needed.  2 extra strength Tylenol 3-4 times a day.  Continue his other prescription medications.  Follow-up will be if he is not improving.  He seemed content with the plan.  "

## 2019-03-29 ENCOUNTER — TELEPHONE (OUTPATIENT)
Dept: FAMILY MEDICINE | Facility: CLINIC | Age: 34
End: 2019-03-29

## 2019-03-29 ENCOUNTER — NURSE TRIAGE (OUTPATIENT)
Dept: NURSING | Facility: CLINIC | Age: 34
End: 2019-03-29

## 2019-03-29 DIAGNOSIS — F41.1 GENERALIZED ANXIETY DISORDER: ICD-10-CM

## 2019-03-29 NOTE — TELEPHONE ENCOUNTER
Left voice message asking pt to call triage back. Triage, please check with pt why he needs Rx now if he filled 03/14/2019.

## 2019-03-29 NOTE — TELEPHONE ENCOUNTER
Pt is requesting Clonazepam refill. Notes he has been off citalopram due to symptoms of nausea. Writer advised OV to discuss alternative. Pt opts to wait for appointment with PCP. Routing message to provider pool for approval.     Controlled Substance Refill Request for clonazePAM (KLONOPIN) 2 MG tablet  Problem List Complete:  Yes  Patient is followed by [facility] for ongoing prescription of benzodiazepines.  All refills should be approved by this provider, or covering partner.    Medication(s): clonazepam 1 mg BID, Gabapentin 600 mg  Maximum quantity per month: 60  Clinic visit frequency required: Q 3 months     Controlled substance agreement on file: No  Benzodiazepine use reviewed by psychiatry:  No    Last [facility] website verification: 03/29/2019    checked in past 3 months?  Yes Patient filled Rx 03/16/2019 #60 tablets for 30 day supply.

## 2019-03-29 NOTE — TELEPHONE ENCOUNTER
Patient had been experiencing citalopram induced nausea and headaches. Per  check, patient filled Rx on 3/14 for 60 tablets for 30 day supply. He did not have citalopram for 2 weeks and experienced an extreme amount of anxiety. He states that this is why all of his clonazepam are gone. When patient spoke to triage previously, patient did not want to schedule an office visit with covering provider. He does have an appointment with PCP on 4/29. Routing to provider and covering team to advise.

## 2019-03-29 NOTE — TELEPHONE ENCOUNTER
Regarding: prescription refill  ----- Message from Felisha Terrell sent at 3/29/2019  5:57 PM CDT -----  Reason for call:  Other   Patient called regarding (reason for call): prescription  Additional comments: refill    Phone number to reach patient:  Home number on file [phone] (home)    Best Time:  asap anytime    Can we leave a detailed message on this number?  YES

## 2019-03-29 NOTE — TELEPHONE ENCOUNTER
Reason for Call:  Other      Detailed comments: out of his medication clonazePAM (KLONOPIN) 2 MG tablet.  Said needs today.  Was also on celexa but doesn't agree with him.    Phone Number Patient can be reached at: Home number on file [phone] (home)    Best Time: asap    Can we leave a detailed message on this number? YES    Call taken on 3/29/2019 at 12:31 PM by JAYSHREE LOOMIS

## 2019-03-30 ENCOUNTER — TELEPHONE (OUTPATIENT)
Dept: FAMILY MEDICINE | Facility: CLINIC | Age: 34
End: 2019-03-30

## 2019-03-30 ENCOUNTER — TRANSFERRED RECORDS (OUTPATIENT)
Dept: HEALTH INFORMATION MANAGEMENT | Facility: CLINIC | Age: 34
End: 2019-03-30

## 2019-03-30 NOTE — TELEPHONE ENCOUNTER
"Reason for Call/Nurse Assessment:  Ramon calling for a verbal authorization for a new refill of his clonazePAM (KLONOPIN) 2 MG tablet. Reports \"the A team messed up and I need to get a refill, even if I have to pay cash\". Patient requesting to have on call provider paged despite FNA nurse stating that the on call providers do not refill control substances after hours.     RN Action/Disposition:  This nurse paged the on call provider (Dr. Jordi Torres) for the [facility] at 7:02pm via smart web to call the nurse back directly at [phone] for 2nd level triage. FNA nurse connected Dr. Torres to the patient's phone number and he left a message for the patient instructing him to \"make an appt to be seen in clinic tomorrow morning regarding his medication request\".    Beryl Foreman RN  [facility]    "

## 2019-03-30 NOTE — TELEPHONE ENCOUNTER
"Reason for Call/Nurse Assessment:  Ramon calling for a verbal authorization for a new refill of his clonazePAM (KLONOPIN) 2 MG tablet. Reports \"the A team messed up and I need to get a refill, even if I have to pay cash\". Patient requesting to have on call provider paged despite [facility] nurse stating that the on call providers do not refill control substances after hours.     RN Action/Disposition:  This nurse paged the on call provider (Dr. Jordi Torres) for the [facility] at 7:02pm via smart web to call the nurse back directly at [phone] for 2nd level triage. [facility] nurse connected Dr. Torres to the patient's phone number and he left a message for the patient instructing him to \"make an appt to be seen in clinic tomorrow morning regarding his medication request\".    Beryl Foreman RN  [facility]    Smart Web message:     patient Ramon Jamison   1985  Call back # [phone], FNA  Synopsis: 34 yr old, requesting to speak to on call about his clonazepam.  MRN:  [id number]  PCP Shirley Cordoba RN/[facility]    Reason for Disposition    [1] Caller requests to speak ONLY to PCP AND [2] URGENT question    Protocols used: PCP CALL - NO TRIAGE-ADULT-      "

## 2019-03-30 NOTE — TELEPHONE ENCOUNTER
Reason for Call:  Other call back    Detailed comments: Patient calling in regards to his clonazePAM (KLONOPIN) 2 MG tablet. Patient would like to talk to Dr Torres again as he was the one he talked to last night. Please call. Patient made an appt for Monday    Phone Number Patient can be reached at: Home number on file [phone] (home)    Best Time: any    Can we leave a detailed message on this number? YES    Call taken on 3/30/2019 at 9:30 AM by VISHNU CEVALLOS

## 2019-03-31 ENCOUNTER — NURSE TRIAGE (OUTPATIENT)
Dept: NURSING | Facility: CLINIC | Age: 34
End: 2019-03-31

## 2019-03-31 NOTE — TELEPHONE ENCOUNTER
FNA triage call :   Presenting problem :  Pt called . Seen at [facility] yesterday  - Provider said Pt is  not in withdrawal , and gave antihistamine  .     Anxious  , out of benzo's 3/29/19  and has appt tomorrow  For refill of  Klonopin 2mg and takes 2 days -and requesting 14 tablets earily before insurance that He will pay for out of pocket because it is too early but need a provider to verbally call in ok for this early refill  .    followed at  [facility] on [facility] and see Dr Yeyo Watson  / Jose Miller @ [phone] .     Disposition and recommendations :  SMart web paged per Pt insistence that ask on-call provider to verbally call and Ok getting 14 doses to get out of pocket before insurance covers Rx and has been out since 3/29/19    smart web paged @ 145pm Dr Jordi Torres  And Dr Torres will call Pt directly. Pt has appt tomorrow at clinic .   Caller verbalizes understanding and denies further questions and will call back if further symptoms to triage or questions  . Lilia Sanon RN  - [facility]

## 2019-04-01 ENCOUNTER — OFFICE VISIT (OUTPATIENT)
Dept: FAMILY MEDICINE | Facility: CLINIC | Age: 34
End: 2019-04-01
Payer: COMMERCIAL

## 2019-04-01 VITALS
HEART RATE: 65 BPM | SYSTOLIC BLOOD PRESSURE: 122 MMHG | DIASTOLIC BLOOD PRESSURE: 80 MMHG | TEMPERATURE: 97.3 F | OXYGEN SATURATION: 100 %

## 2019-04-01 DIAGNOSIS — F41.1 GENERALIZED ANXIETY DISORDER: ICD-10-CM

## 2019-04-01 PROBLEM — R03.0 ELEVATED BLOOD PRESSURE READING WITHOUT DIAGNOSIS OF HYPERTENSION: Status: RESOLVED | Noted: 2018-11-05 | Resolved: 2019-04-01

## 2019-04-01 PROCEDURE — 99214 OFFICE O/P EST MOD 30 MIN: CPT | Performed by: PHYSICIAN ASSISTANT

## 2019-04-01 RX ORDER — CLONAZEPAM 2 MG/1
2 TABLET ORAL
Qty: 60 TABLET | Refills: 2 | Status: SHIPPED | OUTPATIENT
Start: 2019-04-01 | End: 2019-04-26

## 2019-04-01 RX ORDER — GABAPENTIN 600 MG/1
TABLET ORAL
Qty: 150 TABLET | Refills: 2 | Status: SHIPPED | OUTPATIENT
Start: 2019-04-01 | End: 2019-06-06

## 2019-04-01 RX ORDER — GABAPENTIN 600 MG/1
TABLET ORAL
Qty: 150 TABLET | Refills: 2 | Status: SHIPPED | OUTPATIENT
Start: 2019-04-01 | End: 2019-04-01

## 2019-04-01 RX ORDER — BUPROPION HYDROCHLORIDE 150 MG/1
150 TABLET ORAL EVERY MORNING
Qty: 30 TABLET | Refills: 1 | Status: SHIPPED | OUTPATIENT
Start: 2019-04-01 | End: 2019-06-04

## 2019-04-01 ASSESSMENT — ANXIETY QUESTIONNAIRES
5. BEING SO RESTLESS THAT IT IS HARD TO SIT STILL: SEVERAL DAYS
6. BECOMING EASILY ANNOYED OR IRRITABLE: NOT AT ALL
2. NOT BEING ABLE TO STOP OR CONTROL WORRYING: SEVERAL DAYS
7. FEELING AFRAID AS IF SOMETHING AWFUL MIGHT HAPPEN: SEVERAL DAYS
7. FEELING AFRAID AS IF SOMETHING AWFUL MIGHT HAPPEN: SEVERAL DAYS
4. TROUBLE RELAXING: SEVERAL DAYS
3. WORRYING TOO MUCH ABOUT DIFFERENT THINGS: SEVERAL DAYS
GAD7 TOTAL SCORE: 6
GAD7 TOTAL SCORE: 6
1. FEELING NERVOUS, ANXIOUS, OR ON EDGE: SEVERAL DAYS
GAD7 TOTAL SCORE: 6

## 2019-04-01 ASSESSMENT — PATIENT HEALTH QUESTIONNAIRE - PHQ9
SUM OF ALL RESPONSES TO PHQ QUESTIONS 1-9: 6
SUM OF ALL RESPONSES TO PHQ QUESTIONS 1-9: 6
10. IF YOU CHECKED OFF ANY PROBLEMS, HOW DIFFICULT HAVE THESE PROBLEMS MADE IT FOR YOU TO DO YOUR WORK, TAKE CARE OF THINGS AT HOME, OR GET ALONG WITH OTHER PEOPLE: SOMEWHAT DIFFICULT

## 2019-04-01 NOTE — TELEPHONE ENCOUNTER
I called the patient.       I ordered 2 tablets of 2 mg clonazepam; for this afternoon, and for tomorrow AM.        He will keep his appt tomorrow at 10:50.

## 2019-04-01 NOTE — PROGRESS NOTES
SUBJECTIVE:   Ramon Jamison is a 34 year old male who presents to clinic today for the following health issues:    Anxiety Follow-Up    Status since last visit: Worsened     Other associated symptoms:None    Complicating factors:   Significant life event: Yes-  Dog got hit by car   Current substance abuse: None  Depression symptoms: No  TAMMY-7 SCORE 11/5/2018 12/17/2018 4/1/2019   Total Score 4 (minimal anxiety) 0 (minimal anxiety) 6 (mild anxiety)   Total Score 4 0 6       TAMMY-7    Amount of exercise or physical activity: None    Problems taking medications regularly: Yes,  Ran out of meds    Medication side effects: stopped celexa made feel weird    Diet: regular (no restrictions)      Answers for HPI/ROS submitted by the patient on 4/1/2019   If you checked off any problems, how difficult have these problems made it for you to do your work, take care of things at home, or get along with other people?: Somewhat difficult  PHQ9 TOTAL SCORE: 6  TAMMY 7 TOTAL SCORE: 6  Reviewed and updated as needed this visit by clinical staff  Tobacco  Allergies  Meds  Problems  Med Hx  Surg Hx  Fam Hx  Soc Hx        Reviewed and updated as needed this visit by Provider  Tobacco  Allergies  Meds  Problems  Med Hx  Surg Hx  Fam Hx       Additional complaints: None    HPI additional notes: Ramon presents today with   Chief Complaint   Patient presents with     Recheck Medication   Stopped celexa 3 weeks ago because of nausea for a side effects and had worsening anxiety for which he took extra doses of his klonopin causing him to run out early.  He was out of medication for two days.  MLO was on call yesterday and did give him a 1 day supply to get him to today's appointment.     Per chart review, pt was previously on effexor, celexa, paxil and zoloft.  He has not been on wellbutrin.  He understands that he needs to take his medications as prescribed.       ROS:  C: Negative for fever, chills, recent change in  weight  Skin: Negative for worrisome rashes or lesions  ENT: Negative for ear, mouth and throat problems  Resp: Negative for significant cough or SOB  CV: Negative for chest pain or peripheral edema  GI: Negative for nausea, abdominal pain, heartburn, or change in bowel habits  MS: Negative for significant arthralgias or myalgias  PSYCHIATRIC: POSITIVE for anxiety, panic attack, stress or psychomotor agitation. Negative for depressed mood  ROS all other systems negative.    Chart Review:    PHQ-9 SCORE 11/5/2018 12/17/2018 4/1/2019   PHQ-9 Total Score MyChart 1 (Minimal depression) 0 6 (Mild depression)   PHQ-9 Total Score 1 0 6     TAMMY-7 SCORE 11/5/2018 12/17/2018 4/1/2019   Total Score 4 (minimal anxiety) 0 (minimal anxiety) 6 (mild anxiety)   Total Score 4 0 6       History   Smoking Status     Current Every Day Smoker     Packs/day: 0.50     Years: 7.00     Types: Cigarettes   Smokeless Tobacco     Never Used     Patient Active Problem List   Diagnosis     Generalized anxiety disorder     Social phobia     Tobacco use disorder     Attention deficit hyperactivity disorder (ADHD)     Migraine without status migrainosus, not intractable     Past Surgical History:   Procedure Laterality Date     NO HISTORY OF SURGERY       Problem list, Medication list, Allergies, Medical/Social/Surg hx reviewed in [facility], updated as appropriate.   OBJECTIVE:                                                    /80 (Cuff Size: Adult Regular)   Pulse 65   Temp 97.3  F (36.3  C) (Tympanic)   SpO2 100%   There is no height or weight on file to calculate BMI.  GENERAL: healthy, alert, in no acute distress  SKIN: no suspicious lesions, no rashes  PSYCH:Mental Status Exam  Behavior: agitated  Speech: normal rate and rhythm  Mood: anxious and worried  Affect: Anxious/Nervous  Thought Processes: Goal directed  Thought Content: no evidence of suicidal or homicidal ideation and no overt psychosis  Insight: Fair  Judgment: Adequate for  safety    Diagnostic test results: none      ASSESSMENT/PLAN:                                                          ICD-10-CM    1. Generalized anxiety disorder F41.1 clonazePAM (KLONOPIN) 2 MG tablet     buPROPion (WELLBUTRIN XL) 150 MG 24 hr tablet     gabapentin (NEURONTIN) 600 MG tablet     DISCONTINUED: gabapentin (NEURONTIN) 600 MG tablet     Will trial course of wellbutrin.  will refill klonopin and gabapentin today but they must last 30 days which I have put on his prescriptions.  Will follow up in 1 month for medication check, can wait for three months if everything is going well.    Please see patient instructions for treatment details.    Return in about 1 month (around 5/1/2019) for Anxiety Recheck.      Malaika Velez PA-C  [facility]

## 2019-04-02 ASSESSMENT — ANXIETY QUESTIONNAIRES: GAD7 TOTAL SCORE: 6

## 2019-04-26 DIAGNOSIS — F41.1 GENERALIZED ANXIETY DISORDER: ICD-10-CM

## 2019-04-26 RX ORDER — CLONAZEPAM 2 MG/1
2 TABLET ORAL
Qty: 60 TABLET | Refills: 2 | Status: SHIPPED | OUTPATIENT
Start: 2019-04-26 | End: 2019-05-09

## 2019-04-26 NOTE — TELEPHONE ENCOUNTER
Reason for Call:  Other call back  Detailed comments: patient would like an early fill on his    clonazePAM (KLONOPIN) 2 MG tablet     Phone Number Patient can be reached at: Home number on file [phone] (home)  Best Time: any  Can we leave a detailed message on this number? YES  Call taken on 4/26/2019 at 12:49 PM by SHASHI RAMIREZ

## 2019-04-26 NOTE — TELEPHONE ENCOUNTER
Verified with pharmacy they have everything needed for patient to receive Klonapin fill today.     Talked with patient to let him know the early refill was approved by provider and he should be hearing from pharmacy when able to .

## 2019-04-27 NOTE — TELEPHONE ENCOUNTER
Dr Mason printed and signed a new rx for pt, I left this in the fax bin because Im not sure that the rx is needed.  According to Francisca BRYANT message it sound like pt had refills and this was taken care of for a early refill.  I tried to call pt to make sure he had received his rx with no answer.

## 2019-04-27 NOTE — TELEPHONE ENCOUNTER
Pt calling back, states he got the okay for early fill of Klonopin, he does not need a refill, please disregard/cancel the script that was written yesterday.      Maryanne Sanderson RN/FNA

## 2019-05-08 DIAGNOSIS — F41.1 GENERALIZED ANXIETY DISORDER: ICD-10-CM

## 2019-05-08 RX ORDER — CLONAZEPAM 2 MG/1
2 TABLET ORAL
Qty: 60 TABLET | Refills: 2 | Status: CANCELLED | OUTPATIENT
Start: 2019-05-08

## 2019-05-08 NOTE — TELEPHONE ENCOUNTER
Patient states he has refills on his medication he just need a verbal approval called in to his [facility] in [place].

## 2019-05-08 NOTE — TELEPHONE ENCOUNTER
Controlled Substance Refill Request for clonazePAM (KLONOPIN) 2 MG tablet  Problem List Complete:  Yes    Patient is followed by HEIDI PENN for ongoing prescription of benzodiazepines.  All refills should be approved by this provider, or covering partner.     Medication(s): clonazepam 1 mg BID, Gabapentin 600 mg  Maximum quantity per month: 60  Clinic visit frequency required: Q 3 months      Controlled substance agreement on file: No  Benzodiazepine use reviewed by psychiatry:  No     Last [facility] website verification: 3/29/19 recommend provider review- last fill 3/14 #60 tabs for 30 day supply        Previous failed medications: celexa (nausea), effexor, zoloft, paxil    Last Written Prescription Date:  4/26/2019  Last Fill Quantity: 60 tablet,  # refills: 2   Last office visit: 4/1/2019 with prescribing provider:  Rosie Cassidy Office Visit:   Next 5 appointments (look out 90 days)    May 20, 2019 11:00 AM CDT  [facility] [address] [place] [id number]  [phone]             Controlled substance agreement:   Encounter-Level CSA:    There are no encounter-level csa.     Patient-Level CSA:    There are no patient-level csa.         Last Urine Drug Screen: No results found for: CDAUT, No results found for: COMDAT, No results found for: THC13, PCP13, COC13, MAMP13, OPI13, AMP13, BZO13, TCA13, MTD13, BAR13, OXY13, PPX13, BUP13     Processing:  Patient will     [email or web address]   checked in past 3 months?  Yes 3/29/2019

## 2019-05-08 NOTE — TELEPHONE ENCOUNTER
Message left on  to call triage back for patient.     [facility] in [place] was called. Pt has picked up last Rx of #60 Clonazepam on 4/26. Next refill due on 5/26, but he can pick it up 3 days early on 5/23.     Patient called back clinic. He has been using clonazepam more often 1 tablet 3 or 4 times a day on the most stressful days. His dog got his by a car and he got a summons for a felony charge for something he did a long time ago - violated probation. His dog is alive and he saw a  and feels that his anxiety is back to baseline now. He does not believe he is abusing this. He feels that he can get back on 1 tablet and 2 times a day.    He is requesting Dr. Watson to approve pharmacy to dispense his next refill that is only available on 5/23.

## 2019-05-08 NOTE — TELEPHONE ENCOUNTER
Reason for Call:  Medication or medication refill:    Do you use a [facility]?  Name of the pharmacy and phone number for the current request:  Written will  at [facility]    Name of the medication requested: clonazePAM (KLONOPIN) 2 MG tablet    Other request: Patient states needs this today if at all possible    Patient may not have picture ID    Can we leave a detailed message on this number? YES    Phone number patient can be reached at: Home number on file [phone] (home)    Best Time: *anytime    Call taken on 5/8/2019 at 12:02 PM by BRITANY BONDS

## 2019-05-09 RX ORDER — CLONAZEPAM 2 MG/1
2 TABLET ORAL
Qty: 10 TABLET | Refills: 0 | Status: SHIPPED | OUTPATIENT
Start: 2019-05-09 | End: 2019-05-13

## 2019-05-09 NOTE — TELEPHONE ENCOUNTER
Pt called requesting status of Clonazepam refill. Huddled with ANNETTE Cruz. Provider would approve 4 day supply but pt is to schedule an appointment. Reception verified pt has scheduled a future appointment. Rx can be faxed to [facility].

## 2019-05-09 NOTE — TELEPHONE ENCOUNTER
Received call from Pharmacist. She is refusing to fill this refill of Clonazepam.     Patient Contact    Attempt # 1    Was call answered?  No.  Left message on voicemail with information to call triage back.    Upon callback, find out what other pharmacy the patient would like us to fax this Rx to.

## 2019-05-09 NOTE — TELEPHONE ENCOUNTER
Patient returned call to clinic and stated that a new Pharmacist came on their shift and did fill this Rx for him.

## 2019-05-09 NOTE — TELEPHONE ENCOUNTER
Scheduled patient for follow up appointment with provider. He is requesting 4 days worth of medication to get him to the appointment. Will huddle with provider and call patient.

## 2019-05-13 ENCOUNTER — OFFICE VISIT (OUTPATIENT)
Dept: FAMILY MEDICINE | Facility: CLINIC | Age: 34
End: 2019-05-13
Payer: COMMERCIAL

## 2019-05-13 ENCOUNTER — TELEPHONE (OUTPATIENT)
Dept: FAMILY MEDICINE | Facility: CLINIC | Age: 34
End: 2019-05-13

## 2019-05-13 VITALS — TEMPERATURE: 98.9 F | HEART RATE: 68 BPM | OXYGEN SATURATION: 97 %

## 2019-05-13 DIAGNOSIS — F41.1 GENERALIZED ANXIETY DISORDER: ICD-10-CM

## 2019-05-13 PROCEDURE — 99213 OFFICE O/P EST LOW 20 MIN: CPT | Performed by: FAMILY MEDICINE

## 2019-05-13 RX ORDER — CLONAZEPAM 2 MG/1
2 TABLET ORAL
Qty: 60 TABLET | Refills: 0 | Status: SHIPPED | OUTPATIENT
Start: 2019-05-13 | End: 2019-05-20

## 2019-05-13 ASSESSMENT — ANXIETY QUESTIONNAIRES
7. FEELING AFRAID AS IF SOMETHING AWFUL MIGHT HAPPEN: NOT AT ALL
IF YOU CHECKED OFF ANY PROBLEMS ON THIS QUESTIONNAIRE, HOW DIFFICULT HAVE THESE PROBLEMS MADE IT FOR YOU TO DO YOUR WORK, TAKE CARE OF THINGS AT HOME, OR GET ALONG WITH OTHER PEOPLE: SOMEWHAT DIFFICULT
GAD7 TOTAL SCORE: 1
1. FEELING NERVOUS, ANXIOUS, OR ON EDGE: SEVERAL DAYS
6. BECOMING EASILY ANNOYED OR IRRITABLE: NOT AT ALL
5. BEING SO RESTLESS THAT IT IS HARD TO SIT STILL: NOT AT ALL
GAD7 TOTAL SCORE: 1
1. FEELING NERVOUS, ANXIOUS, OR ON EDGE: SEVERAL DAYS
6. BECOMING EASILY ANNOYED OR IRRITABLE: NOT AT ALL
3. WORRYING TOO MUCH ABOUT DIFFERENT THINGS: NOT AT ALL
3. WORRYING TOO MUCH ABOUT DIFFERENT THINGS: NOT AT ALL
7. FEELING AFRAID AS IF SOMETHING AWFUL MIGHT HAPPEN: NOT AT ALL
4. TROUBLE RELAXING: NOT AT ALL
7. FEELING AFRAID AS IF SOMETHING AWFUL MIGHT HAPPEN: NOT AT ALL
GAD7 TOTAL SCORE: 1
2. NOT BEING ABLE TO STOP OR CONTROL WORRYING: NOT AT ALL
5. BEING SO RESTLESS THAT IT IS HARD TO SIT STILL: NOT AT ALL
GAD7 TOTAL SCORE: 1
2. NOT BEING ABLE TO STOP OR CONTROL WORRYING: NOT AT ALL

## 2019-05-13 ASSESSMENT — PATIENT HEALTH QUESTIONNAIRE - PHQ9
SUM OF ALL RESPONSES TO PHQ QUESTIONS 1-9: 1
5. POOR APPETITE OR OVEREATING: NOT AT ALL
SUM OF ALL RESPONSES TO PHQ QUESTIONS 1-9: 1
10. IF YOU CHECKED OFF ANY PROBLEMS, HOW DIFFICULT HAVE THESE PROBLEMS MADE IT FOR YOU TO DO YOUR WORK, TAKE CARE OF THINGS AT HOME, OR GET ALONG WITH OTHER PEOPLE: NOT DIFFICULT AT ALL

## 2019-05-13 NOTE — LETTER
[facility]  05/13/19    Patient: Ramon Jamison  YOB: 1985  Medical Record Number: [id number]  CSN: [id number]                                                                              Non-opioid Controlled Substance Agreement    I understand that my care provider has prescribed a controlled substance to help manage my condition(s). I am taking this medicine to help me function or work. I know this is strong medicine, and that it can cause serious side effects. Controlled substances can be sedating, addicting and may cause a dependency on the drug. They can affect my ability to drive or think, and cause depression. They need to be taken exactly as prescribed. Combining controlled substances with certain medicines or chemicals (such as cocaine, sedatives and tranquilizers, sleeping pills, meth) can be dangerous or even fatal. Also, if I stop controlled substances suddenly, I may have severe withdrawal symptoms.  If not helpful, I may be asked to stop them.    The risks, benefits, and side effects of these medicine(s) were explained to me. I agree that:    1. I will take part in other treatments as advised by my care team. This may be psychiatry or counseling, physical therapy, behavioral therapy, group treatment or a referral to a pain clinic. I will reduce or stop my medicine when my care team tells me to do so.  2. I will take my medicines as prescribed. I will not change the dose or schedule unless my care team tells me to. There will be no refills if I  run out early.   I may be contactedwithout warning and asked to complete a urine drug test or pill count at any time.   3. I will keep all my appointments, and understand this is part of the monitoring of controlled substances. My care team may require an office visit for EVERY controlled substance refill. If I miss appointments or don t follow instructions, my care team may stop my medicine.  4. I will not ask other  providers to prescribe controlled substances, and I will not accept controlled substances from other people. If I need another prescribed controlled substance for a new reason, I will tell my care team within 1 business day.  5. I will use one pharmacy to fill all of my controlled substance prescriptions, and it is up to me to make sure that I do not run out of my medicines on weekends or holidays. If my care team is willing to refill my controlled substance prescription without a visit, I must request refills only during office hours, refills may take up to 3 days to process, and it may take up to 5 to 7 days for my medicine to be mailed and ready at my pharmacy. Prescriptions will not be mailed anywhere except my pharmacy.    6. I am responsible for my prescriptions. If the medicine/prescription is lost or stolen, it will not be replaced. I also agree not to share controlled substance medicines with anyone.              [facility]  05/13/19  Patient:  Ramon Jamison  YOB: 1985  Medical Record Number: [id number]  [facility]: [id number]    7. I agree to not use ANY illegal or recreational drugs. This includes marijuana, cocaine, bath salts or other drugs. I agree not to use alcohol unless my care team says I may. I agree to give urine samples whenever asked. If I don t give a urine sample, the care team may stop my medicine.    8. If I enroll in the [place] Medical Marijuana program, I will tell my care team. I will also sign an agreement to share my medical records with my care team.    9. I will bring in my list of medicines (or my medicine bottles) each time I come to the clinic.   10. I will tell my care team right away if I become pregnant or have a new medical problem treated outside of my regular clinic.  11. I understand that this medicine can affect my thinking and judgment. It may be unsafe for me to drive, use machinery and do dangerous tasks. I will not do any of these  things until I know how the medicine affects me. If my dose changes, I will wait to see how it affects me. I will contact my care team if I have concerns about medicine side effects.    I understand that if I do not follow any of the conditions above, my prescriptions or treatment may be stopped.      I agree that my provider, clinic care team, and pharmacy may work with any city, state or federal law enforcement agency that investigates the misuse, sale, or other diversion of my controlled medicine. I will allow my provider to discuss my care with or share a copy of this agreement with any other treating provider, pharmacy or emergency room where I receive care. I agree to give up (waive) any right of privacy or confidentiality with respect to these consents.   I have read this agreement and have asked questions about anything I did not understand.    ____________________________________________________    ____________  ________  Patient signature                                                         Date      Time    ____________________________________________________     ____________  ________  Witness                                                          Date      Time    ____________________________________________________  Provider signature

## 2019-05-13 NOTE — TELEPHONE ENCOUNTER
Early refill request: Clonazepam        Met with PCP for new prescription today. No further follow up.

## 2019-05-13 NOTE — PROGRESS NOTES
SUBJECTIVE:   Ramon Jamison is a 34 year old male who presents to clinic today for the following   health issues:      Anxiety Follow-Up    Status since last visit: Worsened     Other associated symptoms:None    Complicating factors:   Significant life event: Yes-  [place]mons for court and dog got hit by a car (living)   Current substance abuse: None  Depression symptoms: No  TAMMY-7 SCORE 4/1/2019 5/13/2019 5/13/2019   Total Score 6 (mild anxiety) - 1 (minimal anxiety)   Total Score 6 1 1       TAMMY-7    Amount of exercise or physical activity: 6-7 days/week for an average of 30-45 minutes    Problems taking medications regularly: No    Medication side effects: none    Diet: regular (no restrictions)            Additional history: as documented    Reviewed  and updated as needed this visit by clinical staff  Tobacco  Allergies  Meds  Med Hx  Surg Hx  Fam Hx  Soc Hx        Reviewed and updated as needed this visit by Provider         Patient Active Problem List   Diagnosis     Generalized anxiety disorder     Social phobia     Tobacco use disorder     Attention deficit hyperactivity disorder (ADHD)     Migraine without status migrainosus, not intractable     Past Surgical History:   Procedure Laterality Date     NO HISTORY OF SURGERY         Social History     Tobacco Use     Smoking status: Current Every Day Smoker     Packs/day: 0.50     Years: 7.00     Pack years: 3.50     Types: Cigarettes     Smokeless tobacco: Never Used   Substance Use Topics     Alcohol use: Yes     Alcohol/week: 0.0 oz     Comment: once monthly     Family History   Problem Relation Age of Onset     Unknown/Adopted Mother      Unknown/Adopted Father            ROS:  Constitutional, HEENT, cardiovascular, pulmonary, gi and gu systems are negative, except as otherwise noted.    OBJECTIVE:                                                    Pulse 68   Temp 98.9  F (37.2  C) (Tympanic)   SpO2 97%   There is no height or weight on file to  calculate BMI.  GENERAL APPEARANCE: healthy, alert and no distress         ASSESSMENT/PLAN:                                                        ICD-10-CM    1. Generalized anxiety disorder F41.1 clonazePAM (KLONOPIN) 2 MG tablet       Patient Instructions   CSA was completed. Rx written for one month. Follow up in one month.      Yeyo Watson MD  [facility]        Answers for HPI/ROS submitted by the patient on 5/13/2019   If you checked off any problems, how difficult have these problems made it for you to do your work, take care of things at home, or get along with other people?: Not difficult at all  PHQ9 TOTAL SCORE: 1  TAMMY 7 TOTAL SCORE: 1

## 2019-05-13 NOTE — TELEPHONE ENCOUNTER
Reason for Call:  Other prescription    Detailed comments: Clyde from [facility] called.  Wants to make sure this is ok to fill.  Pt using faster than prescribed.  Unable to send through ins.  Pt will be paying cash.      Phone Number Patient can be reached at: Home number on file [phone] (home)    Best Time: any    Can we leave a detailed message on this number? YES    Call taken on 5/13/2019 at 3:54 PM by CHIQUITA ALEX

## 2019-05-14 ASSESSMENT — PATIENT HEALTH QUESTIONNAIRE - PHQ9: SUM OF ALL RESPONSES TO PHQ QUESTIONS 1-9: 1

## 2019-05-14 ASSESSMENT — ANXIETY QUESTIONNAIRES: GAD7 TOTAL SCORE: 1

## 2019-05-20 ENCOUNTER — TELEPHONE (OUTPATIENT)
Dept: FAMILY MEDICINE | Facility: CLINIC | Age: 34
End: 2019-05-20

## 2019-05-20 DIAGNOSIS — F41.1 GENERALIZED ANXIETY DISORDER: ICD-10-CM

## 2019-05-20 RX ORDER — CLONAZEPAM 2 MG/1
2 TABLET ORAL
Qty: 60 TABLET | Refills: 0 | Status: SHIPPED | OUTPATIENT
Start: 2019-05-20 | End: 2019-06-12

## 2019-05-20 NOTE — TELEPHONE ENCOUNTER
Talked with pharmacy this AM. [facility]-pharmacist    States that patient has received 160 tabs since 4/1/2019. He comes in for an early refill each time a prescription is written, it does not last the full 30 days it is supposed it.     Currently he needs provider to okay an early refill of 8 days per the pharmacy and also needs to know how many pills would like provider to approve.     Huddle with provider: okay to use order that was written for today.

## 2019-05-20 NOTE — TELEPHONE ENCOUNTER
Reason for Call:  Other verbal approval of RX    Detailed comments: Ramon called requesting the doctor contact the pharmacy to verbally approve the refill of clonazePAM (KLONOPIN).    [facility] [phone]    He was dispensed have of his med on the 13 due to money, but now wants the remainder of the RX.     Phone Number Patient can be reached at: Cell number on file:    Telephone Information:   Mobile [phone]       Best Time: call Ramon for any questions.    Can we leave a detailed message on this number? YES    Call taken on 5/20/2019 at 1:27 PM by Carolina Hamilton

## 2019-05-20 NOTE — TELEPHONE ENCOUNTER
Reason for Call:  Other    Detailed comments: on May 15, patient picked up 1/2 of his RX for clonazepam now would like to  the next 30.  Needs someone to call the pharmacy and verify.  Pharmacy [facility] [place]    Phone Number Patient can be reached at: Home number on file [phone] (home)    Best Time: acap    Can we leave a detailed message on this number? YES    Call taken on 5/20/2019 at 11:23 AM by JAYSHREE LOOMIS

## 2019-05-20 NOTE — TELEPHONE ENCOUNTER
Per  review, pt filled Clonazepam Rx 05/13/2019 #30 tablets. Please approve a new Rx for #30 tablets if provider agrees with refill.

## 2019-05-20 NOTE — TELEPHONE ENCOUNTER
Called [facility] and verified Rx was received . Pharmacist asked for providers approval to fill early. If filled today, pt will fill eight days early. Pharmacist asked if on future Rx's provider could write when pt can fill. Please advice if ok for early fill.

## 2019-05-21 ENCOUNTER — TELEPHONE (OUTPATIENT)
Dept: FAMILY MEDICINE | Facility: CLINIC | Age: 34
End: 2019-05-21

## 2019-05-21 NOTE — TELEPHONE ENCOUNTER
clonazePAM (KLONOPIN) 2 MG tablet    This came over as a new rx for #60 to  last 30 days.  Per dispensing history: 4/1 #60 for cash, 4/26 #60 thru ins , 5/9 #110 for cash 5/13 #30 for cash. Would like a verbal on how many and with what parameters we are doing the full supply or part? Thanks

## 2019-06-04 ENCOUNTER — OFFICE VISIT (OUTPATIENT)
Dept: FAMILY MEDICINE | Facility: CLINIC | Age: 34
End: 2019-06-04
Payer: COMMERCIAL

## 2019-06-04 VITALS
HEIGHT: 73 IN | RESPIRATION RATE: 14 BRPM | SYSTOLIC BLOOD PRESSURE: 128 MMHG | OXYGEN SATURATION: 96 % | WEIGHT: 144 LBS | DIASTOLIC BLOOD PRESSURE: 84 MMHG | BODY MASS INDEX: 19.09 KG/M2 | HEART RATE: 67 BPM

## 2019-06-04 DIAGNOSIS — F33.42 RECURRENT MAJOR DEPRESSION IN COMPLETE REMISSION (H): ICD-10-CM

## 2019-06-04 DIAGNOSIS — F17.200 TOBACCO USE DISORDER: Primary | ICD-10-CM

## 2019-06-04 DIAGNOSIS — F41.1 GENERALIZED ANXIETY DISORDER: ICD-10-CM

## 2019-06-04 PROCEDURE — 99213 OFFICE O/P EST LOW 20 MIN: CPT | Performed by: FAMILY MEDICINE

## 2019-06-04 RX ORDER — BUPROPION HYDROCHLORIDE 150 MG/1
150 TABLET ORAL EVERY MORNING
Qty: 90 TABLET | Refills: 0 | Status: SHIPPED | OUTPATIENT
Start: 2019-06-04 | End: 2019-12-02

## 2019-06-04 ASSESSMENT — MIFFLIN-ST. JEOR: SCORE: 1642.06

## 2019-06-04 ASSESSMENT — PAIN SCALES - GENERAL: PAINLEVEL: NO PAIN (0)

## 2019-06-04 NOTE — PROGRESS NOTES
Subjective     Ramon Jamison is a 34 year old male who presents to clinic today for the following health issues:    HPI   Medication Followup of  Klonopin    Taking Medication as prescribed: yes    Side Effects:  None    Medication Helping Symptoms:  yes     Depression and Anxiety Follow-Up    How are you doing with your depression since your last visit? No change    How are you doing with your anxiety since your last visit?  No change    Are you having other symptoms that might be associated with depression or anxiety? No    Have you had a significant life event? No     Do you have any concerns with your use of alcohol or other drugs? No    Social History     Tobacco Use     Smoking status: Current Every Day Smoker     Packs/day: 0.50     Years: 7.00     Pack years: 3.50     Types: Cigarettes     Smokeless tobacco: Never Used   Substance Use Topics     Alcohol use: Yes     Alcohol/week: 0.0 oz     Comment: once monthly     Drug use: No     PHQ 12/17/2018 4/1/2019 5/13/2019   PHQ-9 Total Score 0 6 1   Q9: Thoughts of better off dead/self-harm past 2 weeks Not at all Not at all Not at all     TAMMY-7 SCORE 4/1/2019 5/13/2019 5/13/2019   Total Score 6 (mild anxiety) - 1 (minimal anxiety)   Total Score 6 1 1           Suicide Assessment Five-step Evaluation and Treatment (SAFE-T)    Patient Active Problem List   Diagnosis     Generalized anxiety disorder     Social phobia     Tobacco use disorder     Attention deficit hyperactivity disorder (ADHD)     Migraine without status migrainosus, not intractable     Recurrent major depression in complete remission (H)     Past Surgical History:   Procedure Laterality Date     NO HISTORY OF SURGERY         Social History     Tobacco Use     Smoking status: Current Every Day Smoker     Packs/day: 0.50     Years: 7.00     Pack years: 3.50     Types: Cigarettes     Smokeless tobacco: Never Used   Substance Use Topics     Alcohol use: Yes     Alcohol/week: 0.0 oz     Comment: once  "monthly     Family History   Problem Relation Age of Onset     Unknown/Adopted Mother      Unknown/Adopted Father              Reviewed and updated as needed this visit by Provider         Review of Systems   ROS COMP: Constitutional, HEENT, cardiovascular, pulmonary, gi and gu systems are negative, except as otherwise noted.      Objective    /84 (BP Location: Right arm, Patient Position: Sitting, Cuff Size: Adult Regular)   Pulse 67   Resp 14   Ht 1.854 m (6' 1\")   Wt 65.3 kg (144 lb)   SpO2 96%   BMI 19.00 kg/m    Body mass index is 19 kg/m .  Physical Exam   GENERAL APPEARANCE: healthy, alert and no distress  PSYCH: mentation appears normal and affect normal/bright            Assessment & Plan       ICD-10-CM    1. Tobacco use disorder F17.200    2. Generalized anxiety disorder F41.1 buPROPion (WELLBUTRIN XL) 150 MG 24 hr tablet   3. Recurrent major depression in complete remission (H) F33.42         Tobacco Cessation:   reports that he has been smoking cigarettes.  He has a 3.50 pack-year smoking history. He has never used smokeless tobacco.  Tobacco Cessation Action Plan: Information offered: Patient not interested at this time        MEDICATIONS:  Continue current medications without change  FUTURE APPOINTMENTS:       - Follow-up visit in 3 months  Patient Instructions   I had a discussion with the patient about his desire to switch from clonazepam to lorazepam.  Given the fact that he takes this chronically I think it is much safer and less likely to cause issues if he stays with the clonazepam.  He was agreeable with that.  I did refill his Wellbutrin for 90 days today.  He will follow-up in 3 months.  He can follow-up sooner as needed.  We discussed quitting smoking and he is not interested at this time.      Return in about 3 months (around 9/4/2019) for anxiety, depression.    Yeyo Watson MD  [facility]        "

## 2019-06-04 NOTE — PATIENT INSTRUCTIONS
I had a discussion with the patient about his desire to switch from clonazepam to lorazepam.  Given the fact that he takes this chronically I think it is much safer and less likely to cause issues if he stays with the clonazepam.  He was agreeable with that.  I did refill his Wellbutrin for 90 days today.  He will follow-up in 3 months.  He can follow-up sooner as needed.  We discussed quitting smoking and he is not interested at this time.

## 2019-06-06 DIAGNOSIS — F41.1 GENERALIZED ANXIETY DISORDER: ICD-10-CM

## 2019-06-06 RX ORDER — GABAPENTIN 600 MG/1
TABLET ORAL
Qty: 150 TABLET | Refills: 0 | Status: SHIPPED | OUTPATIENT
Start: 2019-06-06 | End: 2019-07-02

## 2019-06-06 NOTE — TELEPHONE ENCOUNTER
Patient should have 1 more refill at pharmacy that would have been started 6/1 to last until 7-1.         Talked with pharmacy:  Per Pharmacy and  patient does have 80 pills left for  at his pharmacy however he does not want these because he will have to pay for a refill too soon and will cost him more money.     Pharmacy and patient request: Close out the remaining 80 pills at the pharmacy and write a new order for his normal of 150 dispensed with 2 refills.     Routing refill request to provider for review/approval because:  Drug not on the [facility] refill protocol

## 2019-06-06 NOTE — TELEPHONE ENCOUNTER
Reason for Call:  Medication or medication refill:    Do you use a [facility]?  Name of the pharmacy and phone number for the current request:  [facility] [place]    Name of the medication requested: gabapentin (NEURONTIN) 600 MG tablet    Other request:   Patient states provider forgot to send this to his pharmacy at his appointment this week    He will be out of this tomorrow   Please expedite    Can we leave a detailed message on this number? YES    Phone number patient can be reached at: Home number on file [phone] (home)    Best Time: anytime    Call taken on 6/6/2019 at 9:37 AM by BRITANY BONDS

## 2019-06-06 NOTE — TELEPHONE ENCOUNTER
Controlled Substance Refill Request for gabapentin (NEURONTIN) 600 MG tablet  Problem List Complete:  Yes    Patient is followed by HEIDI PENN for ongoing prescription of benzodiazepines.  All refills should be approved by this provider, or covering partner.     Medication(s): clonazepam 1 mg BID, Gabapentin 600 mg  Maximum quantity per month: 60  Clinic visit frequency required: Q 3 months      Controlled substance agreement on file: No  Benzodiazepine use reviewed by psychiatry:  No     Last [facility] website verification: 3/29/19 recommend provider review- last fill 3/14 #60 tabs for 30 day supply        Previous failed medications: celexa (nausea), effexor, zoloft, paxil    Last Written Prescription Date:  4/1/2019  Last Fill Quantity: 150 tablet,  # refills: 2   Last office visit: 6/4/2019 with prescribing provider:  Shirley   Future Office Visit:      Controlled substance agreement:   Encounter-Level CSA:    There are no encounter-level csa.     Patient-Level CSA:    Controlled Substance Agreement - Non - Opioid - Scan on 5/16/2019 11:50 AM: NON-OPIOID CONTROLLED SUBSTANCE AGREEMENT (below)           Last Urine Drug Screen: No results found for: CDAUT, No results found for: COMDAT, No results found for: THC13, PCP13, COC13, MAMP13, OPI13, AMP13, BZO13, TCA13, MTD13, BAR13, OXY13, PPX13, BUP13     Processing:  [facility] [id number] [place], [place] - [facility] [address] AT [facility] [address]  & [address] pharmacy    [email or web address]   checked in past 3 months?  Yes 3/29/2019

## 2019-06-11 ENCOUNTER — MYC MEDICAL ADVICE (OUTPATIENT)
Dept: FAMILY MEDICINE | Facility: CLINIC | Age: 34
End: 2019-06-11

## 2019-06-11 DIAGNOSIS — F41.1 GENERALIZED ANXIETY DISORDER: ICD-10-CM

## 2019-06-11 NOTE — TELEPHONE ENCOUNTER
Reason for Call:  Medication or medication refill:    Do you use a [facility]?  Name of the pharmacy and phone number for the current request:  michelle on file    Name of the medication requested: clonazePAM (KLONOPIN) 2 MG tablet    Other request: states JRB told him to call and say put start date of 6/16/19    Can we leave a detailed message on this number? YES    Phone number patient can be reached at: Home number on file [phone] (home)    Best Time:     Call taken on 6/11/2019 at 12:34 PM by KATHERIN LUNA

## 2019-06-11 NOTE — TELEPHONE ENCOUNTER
Please see SKURA messages from separate SKURA encounters dated 6/11:     Ramon Carter here. Just testing the system and confirming that i will need my medication, my clonazepam on the 16th, or by the 16th of June and i am not sure if my pharmacy is going to be able to do it.   So, to be able to celebrate fathers day in the best of manners be it that I go up to [place] or camping, I would need those Clonazepam for that     Hey there, Me again, my [facility] at [phone] in [place] has my mylan brand and is able to fill on the 14th out of pocket, paco to insurance issues, so if you would call or fax THEM my prescription for Clonazepam that would be excellent, I am unable to get the correct brand here in town,   so...   Ramon aguirre    Controlled Substance Refill Request for clonazePAM (KLONOPIN) 2 mg  Requested Prescriptions   Pending Prescriptions Disp Refills     clonazePAM (KLONOPIN) 2 MG tablet  Last Written Prescription Date:  5/20/2019  Last Fill Quantity: 60,  # refills: 0   Last office visit: 6/4/2019 with prescribing provider:  Shirley   Future Office Visit:     60 tablet 0     Sig: Take 1 tablet (2 mg) by mouth 2 times daily Must last 30 days       There is no refill protocol information for this order          Patient is followed by HEIDI PENN for ongoing prescription of benzodiazepines.  All refills should be approved by this provider, or covering partner.    Medication(s): clonazepam 1 mg BID, Gabapentin 600 mg  Maximum quantity per month: 60  Clinic visit frequency required: Q 3 months     Controlled substance agreement on file: No  Benzodiazepine use reviewed by psychiatry:  No    Last [facility] website verification: 3/29/19 recommend provider review- last fill 3/14 #60 tabs for 30 day supply    Previous failed medications: celexa (nausea), effexor, zoloft, paxil    Problem List Complete:  Yes   checked in past 3 months?  Yes 6/11/2019      [place]  [place] reviewed[place]  reviewed 6/10/19- recommend provider review    [place]:  [email or web address]  Routing refill request to provider for review/approval because:  Drug not on the [facility] refill protocol

## 2019-06-11 NOTE — TELEPHONE ENCOUNTER
clonazePAM (KLONOPIN)      Last Written Prescription Date:  5-20-19  Last Fill Quantity: 60tab,   # refills: 0  Last Office Visit: 6-4-19  Future Office visit:       Routing refill request to provider for review/approval because:  Drug not on the [facility], P or [facility] refill protocol or controlled substance

## 2019-06-12 RX ORDER — CLONAZEPAM 2 MG/1
2 TABLET ORAL
Qty: 60 TABLET | Refills: 0 | Status: SHIPPED | OUTPATIENT
Start: 2019-06-19 | End: 2019-06-14

## 2019-06-13 ENCOUNTER — MYC MEDICAL ADVICE (OUTPATIENT)
Dept: FAMILY MEDICINE | Facility: CLINIC | Age: 34
End: 2019-06-13

## 2019-06-13 DIAGNOSIS — F41.1 GENERALIZED ANXIETY DISORDER: ICD-10-CM

## 2019-06-13 NOTE — TELEPHONE ENCOUNTER
So, it sounds like.. No matter what, we cannot help this pt without seeing him and updating the directions?  It sounds like he is not using it as directed to so that's an issue he will need to address with his PCP.  I would definitely advise him to see his PCP since his PCP knows him well.  Might be able to do an E-visit with Dr. Watson?  Is he at the clinic tomorrow?  If symptoms become too severe, may always go to  or the hospital.    --Dr. Man

## 2019-06-13 NOTE — TELEPHONE ENCOUNTER
Please see patient brittani question:  Blossom mina Doctor Yeyo Capps and team. Good afternoon or morning wherever this finds you. I am confounded by my pharmacies inability to process the refill you have sent to the [place] location. They are unwilling to fill my prescription until the 27th which puts me in the woods a good 9 days. This is because of what my insurance says. The inability to transfer this prescription is also an obstacle.  So I would love to be able to get this taken care of without any kind of interference by these conflicting numbers. [phone] that is the number to the [facility] in [place] that i am familiar with.  I will need to pay for this prescription which is okay. It is untransferable because it is a new prescription and not a refill.  Feel free to contact me with any concerns, this requestion is urgent though i trust it will find it's way into the correct light before the end of the day.   Thank you,   Ramon Jamison...   and if I need to pick a hard copy up to amend any of this i can.   You all have a beautiful day and thanks for working with me so well. It is in good geovanny I send this letter with the intention that this can be amended so that we may move forward without all this hubbub in the future.   Thank you   -Ramon jamison    Clonazepam is the medication in question.     Talked with [facility] this AM:   1. New order is needed.   2. Also call to cancel from [place]   3. New order can be faxed to the pharmacy    Pharmacy states they cannot fill this medication early because the patient is on a state insurance plan. States that they pharmacy can get into a lot of trouble and lose their contracts if they start filling a medication and allow the patient to pay out of pocket when its needed.     At this time the medication is too early to be refilled per the current directions of this medication.     If patient is not taking as the bottle os prescribed the pharmacist  states that the provider will need to update the sig so it matches what the patient is taking, but also so insurance will be billed the correct amount.     Pharmacist states they will not allow patient to pay out of pocket at anytime for early refills because of the state insurance he has.

## 2019-06-14 RX ORDER — CLONAZEPAM 2 MG/1
2 TABLET ORAL
Qty: 60 TABLET | Refills: 0 | Status: SHIPPED | OUTPATIENT
Start: 2019-06-19 | End: 2019-07-10

## 2019-06-14 NOTE — TELEPHONE ENCOUNTER
Long mychart message sent to patient with provider message, and pharmacist message.     Provider to read for update.     He was advised to set up an appointment with provider to discuss in further detail.    Routing refill request to provider for review/approval because:  Please resend the order that was written to [place]. Transfer cannot be done because it is a new order and not a refill. New order needs to be sent to [facility] as pended.       [facility] pharmacy was called and order for Klonipin was discontinued so new order for [place] can be processed.

## 2019-06-15 ENCOUNTER — NURSE TRIAGE (OUTPATIENT)
Dept: NURSING | Facility: CLINIC | Age: 34
End: 2019-06-15

## 2019-06-15 NOTE — TELEPHONE ENCOUNTER
Pharmacy is calling because Ramon is coming in to get his Klonopin and pay cash for it. It specifically says on the prescription to last 30 days, and that would be 6/20 . The pharmacist wanted to know if she should fill it, because it was raising red flags for her. I said I wouldn't , I would verify with the provider on Monday to specify the dates that his can be filled.    Chelsea Rivera RN/ [facility]      Reason for Disposition    Pharmacy calling with prescription questions and triager unable to answer question    Additional Information    Medication questions    Protocols used: MEDICATION QUESTION CALL-A-AH, INFORMATION ONLY CALL-A-AH

## 2019-06-16 ENCOUNTER — NURSE TRIAGE (OUTPATIENT)
Dept: NURSING | Facility: CLINIC | Age: 34
End: 2019-06-16

## 2019-06-16 ENCOUNTER — TELEPHONE (OUTPATIENT)
Dept: FAMILY MEDICINE | Facility: CLINIC | Age: 34
End: 2019-06-16

## 2019-06-16 NOTE — TELEPHONE ENCOUNTER
Ramon is calling to have a Dr release his Klonopin which is at the pharmacy and just needs a Dr approval to be refilled. I talked with the pharmacy after reading in the chart and seeing that the actual prescription says do not fill until 6/19 . I asked him if he is out of his pills and he said he is getting there, very vague answer never said how many he actually had.  I called him back and informed him the prescription will not be filled until 6/19 and it is to last 30 days. He immediately says so if I talk to my Dr tomorrow I can get it filled, I said no it is written by your Dr to start on the 19 th and your Dr wrote it.  OK, I will call in the morning and talk to my Dr and come and pick it up.    Chelsea Rivera RN/ [facility]

## 2019-06-16 NOTE — TELEPHONE ENCOUNTER
Ramon is calling to have a Dr release his Klonopin which is at the pharmacy and just needs a Dr approval to be refilled. I talked with the pharmacy after reading in the chart and seeing that the actual prescription says do not fill until 6/19 . I asked him if he is out of his pills and he said he is getting there, very vague answer never said how many he actually had.  I called him back and informed him the prescription will not be filled until 6/19 and it is to last 30 days. He immediately says so if I talk to my Dr tomorrow I can get it filled, I said no it is written by your Dr to start on the 19 th and your Dr wrote it.  OK, I will call in the morning and talk to my Dr and come and pick it up.    Chelsea Rivera RN/ [facility]        Reason for Disposition    Caller has NON-URGENT medication question about med that PCP prescribed and triager unable to answer question    Protocols used: MEDICATION QUESTION CALL-A-AH

## 2019-06-17 ENCOUNTER — MYC MEDICAL ADVICE (OUTPATIENT)
Dept: FAMILY MEDICINE | Facility: CLINIC | Age: 34
End: 2019-06-17

## 2019-06-17 NOTE — TELEPHONE ENCOUNTER
Per separate MyChart encounter dated 6/17, patient is aware that he can refill medication on 6/19. No further action needed.

## 2019-07-10 ENCOUNTER — MYC REFILL (OUTPATIENT)
Dept: FAMILY MEDICINE | Facility: CLINIC | Age: 34
End: 2019-07-10

## 2019-07-10 DIAGNOSIS — F41.1 GENERALIZED ANXIETY DISORDER: ICD-10-CM

## 2019-07-10 RX ORDER — GABAPENTIN 600 MG/1
TABLET ORAL
Qty: 150 TABLET | Refills: 0 | Status: CANCELLED | OUTPATIENT
Start: 2019-07-10

## 2019-07-10 NOTE — TELEPHONE ENCOUNTER
Controlled Substance Refill Request for clonazePAM (KLONOPIN) 2 MG tablet  Problem List Complete:  Yes    Patient is followed by HEIDI PENN for ongoing prescription of benzodiazepines.  All refills should be approved by this provider, or covering partner.     Medication(s): clonazepam 1 mg BID, Gabapentin 600 mg  Maximum quantity per month: 60  Clinic visit frequency required: Q 3 months      Controlled substance agreement on file: No  Benzodiazepine use reviewed by psychiatry:  No     Last [facility] website verification: 6/11/2019 recommend provider review        Previous failed medications: celexa (nausea), effexor, zoloft, paxil       Last Written Prescription Date:  6/19/2019  Last Fill Quantity: 60 tablet,  # refills: 0   Last office visit: 6/4/2019 with prescribing provider:  Shirley   Future Office Visit:        Controlled substance agreement:   Encounter-Level CSA:    There are no encounter-level csa.     Patient-Level CSA:    Controlled Substance Agreement - Non - Opioid - Scan on 5/16/2019 11:50 AM: NON-OPIOID CONTROLLED SUBSTANCE AGREEMENT (below)           Last Urine Drug Screen: No results found for: CDAUT, No results found for: COMDAT, No results found for: THC13, PCP13, COC13, MAMP13, OPI13, AMP13, BZO13, TCA13, MTD13, BAR13, OXY13, PPX13, BUP13     Processing:  Fax Rx to [id number]  [facility] - [address] AT [facility] [address]  & [address] pharmacy    [email or web address]   checked in past 3 months?  Yes 6/11/2019

## 2019-07-11 DIAGNOSIS — F41.1 GENERALIZED ANXIETY DISORDER: ICD-10-CM

## 2019-07-11 RX ORDER — CLONAZEPAM 2 MG/1
2 TABLET ORAL
Qty: 60 TABLET | Refills: 0 | Status: SHIPPED | OUTPATIENT
Start: 2019-07-11 | End: 2019-08-13

## 2019-07-11 NOTE — TELEPHONE ENCOUNTER
Script printed and signed, in my Outbasket.      Please remind pt to give 72 business hour notice for refills so we have time to fill their request.

## 2019-07-11 NOTE — TELEPHONE ENCOUNTER
Controlled Substance Refill Request for clonazePAM (KLONOPIN) 2 MG tablet  Problem List Complete:  Yes    Patient is followed by HEIDI PENN for ongoing prescription of benzodiazepines.  All refills should be approved by this provider, or covering partner.     Medication(s): clonazepam 1 mg BID, Gabapentin 600 mg  Maximum quantity per month: 60  Clinic visit frequency required: Q 3 months      Controlled substance agreement on file: No  Benzodiazepine use reviewed by psychiatry:  No     Last [facility] website verification: 6/11/2019 recommend provider review        Previous failed medications: celexa (nausea), effexor, zoloft, paxil       Last Written Prescription Date:  6/19/2019  Last Fill Quantity: 60 tablet,  # refills: 0   Last office visit: 6/4/2019 with prescribing provider:  Shirley   Future Office Visit:        Controlled substance agreement:   Encounter-Level CSA:    There are no encounter-level csa.     Patient-Level CSA:    Controlled Substance Agreement - Non - Opioid - Scan on 5/16/2019 11:50 AM: NON-OPIOID CONTROLLED SUBSTANCE AGREEMENT (below)           Last Urine Drug Screen: No results found for: CDAUT, No results found for: COMDAT, No results found for: THC13, PCP13, COC13, MAMP13, OPI13, AMP13, BZO13, TCA13, MTD13, BAR13, OXY13, PPX13, BUP13     Processing:  Fax Rx to pended pharmacy    [email or web address]   checked in past 3 months?  Yes 6/11/2019

## 2019-07-11 NOTE — TELEPHONE ENCOUNTER
RX [place] program ([place]) reviewed:  not reviewed/not due - last done on 6/11/19    [place]:  [email or web address]  Routing refill request to provider for review/approval because:  Drug not on the [facility] refill protocol

## 2019-07-11 NOTE — TELEPHONE ENCOUNTER
Faxed Rx (lorazepam) to the pharmacy-[facility] in [place]  Sent [employer] message to pt

## 2019-07-11 NOTE — TELEPHONE ENCOUNTER
Controlled Substance Refill Request for clonazePAM (KLONOPIN) 2 MG tablet  Problem List Complete:  Yes    Patient is followed by HEIDI PENN for ongoing prescription of benzodiazepines.  All refills should be approved by this provider, or covering partner.     Medication(s): clonazepam 1 mg BID, Gabapentin 600 mg  Maximum quantity per month: 60  Clinic visit frequency required: Q 3 months      Controlled substance agreement on file: No  Benzodiazepine use reviewed by psychiatry:  No     Last [facility] website verification: 6/11/2019 recommend provider review        Previous failed medications: celexa (nausea), effexor, zoloft, paxil    Last Written Prescription Date:  6/19/2019  Last Fill Quantity: 60 tablet,  # refills: 0   Last office visit: 6/4/2019 with prescribing provider:  Shirley   Future Office Visit:         Controlled substance agreement:   Encounter-Level CSA:    There are no encounter-level csa.     Patient-Level CSA:    Controlled Substance Agreement - Non - Opioid - Scan on 5/16/2019 11:50 AM: NON-OPIOID CONTROLLED SUBSTANCE AGREEMENT (below)           Last Urine Drug Screen: No results found for: CDAUT, No results found for: COMDAT, No results found for: THC13, PCP13, COC13, MAMP13, OPI13, AMP13, BZO13, TCA13, MTD13, BAR13, OXY13, PPX13, BUP13     Processing:  Fax Rx to pended pharmacy    [email or web address]   checked in past 3 months?  Yes 6/11/2019

## 2019-07-12 ENCOUNTER — NURSE TRIAGE (OUTPATIENT)
Dept: NURSING | Facility: CLINIC | Age: 34
End: 2019-07-12

## 2019-07-12 ENCOUNTER — MYC MEDICAL ADVICE (OUTPATIENT)
Dept: FAMILY MEDICINE | Facility: CLINIC | Age: 34
End: 2019-07-12

## 2019-07-12 DIAGNOSIS — F41.1 GENERALIZED ANXIETY DISORDER: ICD-10-CM

## 2019-07-12 RX ORDER — CLONAZEPAM 2 MG/1
TABLET ORAL
Qty: 60 TABLET | Refills: 0 | OUTPATIENT
Start: 2019-07-12

## 2019-07-13 ENCOUNTER — TELEPHONE (OUTPATIENT)
Dept: FAMILY MEDICINE | Facility: CLINIC | Age: 34
End: 2019-07-13

## 2019-07-13 ENCOUNTER — MYC MEDICAL ADVICE (OUTPATIENT)
Dept: FAMILY MEDICINE | Facility: CLINIC | Age: 34
End: 2019-07-13

## 2019-07-13 NOTE — TELEPHONE ENCOUNTER
Reason for Call:  Other call back    Detailed comments: on 7/11/2019 clonazePAM (KLONOPIN) 2 MG tablet was sent to the [facility] in [place].    Due to his Insurance, Ramon says it needs to be sent to [facility] on [address] in [place].    Phone Number Patient can be reached at: Cell number on file:    Telephone Information:   Mobile [phone]       Best Time: any    Can we leave a detailed message on this number? YES    Call taken on 7/13/2019 at 10:17 AM by Carolina Hamilton

## 2019-07-13 NOTE — TELEPHONE ENCOUNTER
CLONAZEPAM 2MG TABLETS      Last Written Prescription Date:  7/11/2019  Last Fill Quantity: 60tablet,   # refills: 0  Last Office Visit: 6/4/2019 Shirley  Future Office visit:       Routing refill request to provider for review/approval because:    Drug not on the [facility], [facility] or [facility] refill protocol or controlled substance

## 2019-07-13 NOTE — TELEPHONE ENCOUNTER
"Caller states he prescription for the Klonopin was sent to the incorrect pharmacy, he wants it sent to [place] pharmacy rather that [place]. Caller advised to call provider within 24 hours. Caller verbalized and understands directives.    Reason for Disposition    Caller has NON-URGENT medication question about med that PCP prescribed and triager unable to answer question    Additional Information    Negative: Drug overdose and nurse unable to answer question    Negative: Caller requesting information not related to medicine    Negative: Caller requesting a prescription for Strep throat and has a positive culture result    Negative: Rash while taking a medication or within 3 days of stopping it    Negative: Immunization reaction suspected    Negative: [1] Asthma and [2] having symptoms of asthma (cough, wheezing, etc)    Negative: MORE THAN A DOUBLE DOSE of a prescription or over-the-counter (OTC) drug    Negative: [1] DOUBLE DOSE (an extra dose or lesser amount) of over-the-counter (OTC) drug AND [2] any symptoms (e.g., dizziness, nausea, pain, sleepiness)    Negative: [1] DOUBLE DOSE (an extra dose or lesser amount) of prescription drug AND [2] any symptoms (e.g., dizziness, nausea, pain, sleepiness)    Negative: Took another person's prescription drug    Negative: [1] DOUBLE DOSE (an extra dose or lesser amount) of prescription drug AND [2] NO symptoms (Exception: a double dose of antibiotics)    Negative: Diabetes drug error or overdose (e.g., insulin or extra dose)    Negative: [1] Request for URGENT new prescription or refill of \"essential\" medication (i.e., likelihood of harm to patient if not taken) AND [2] triager unable to fill per unit policy    Negative: [1] Prescription not at pharmacy AND [2] was prescribed today by PCP    Negative: Pharmacy calling with prescription questions and triager unable to answer question    Negative: Caller has URGENT medication question about med that PCP prescribed and " triager unable to answer question    Protocols used: MEDICATION QUESTION CALL-A-AH

## 2019-07-15 ENCOUNTER — MYC MEDICAL ADVICE (OUTPATIENT)
Dept: FAMILY MEDICINE | Facility: CLINIC | Age: 34
End: 2019-07-15

## 2019-07-15 RX ORDER — CLONAZEPAM 2 MG/1
TABLET ORAL
Qty: 60 TABLET | Refills: 0 | OUTPATIENT
Start: 2019-07-15

## 2019-07-15 NOTE — TELEPHONE ENCOUNTER
Per telephone encounter dated 7/13, rx was processed and sent to [facility]. No further action needed.

## 2019-07-15 NOTE — TELEPHONE ENCOUNTER
Sent [employer] message. Per chart review (see separate mychart encounters and telephone encounter), refill was called into pharmacy. No further action needed.

## 2019-08-01 DIAGNOSIS — F41.1 GENERALIZED ANXIETY DISORDER: ICD-10-CM

## 2019-08-01 RX ORDER — GABAPENTIN 600 MG/1
TABLET ORAL
Qty: 150 TABLET | Refills: 0 | Status: SHIPPED | OUTPATIENT
Start: 2019-08-05 | End: 2019-08-26

## 2019-08-01 NOTE — TELEPHONE ENCOUNTER
Last OV 06/06/2019.    gabapentin (NEURONTIN) 600 MG tablet 150 tablet 0 7/6/2019       Controlled Substance Refill Request for gabapentin (NEURONTIN) 600 MG tablet  Problem List Complete:  Yes  Patient is followed by HEIDI PENN for ongoing prescription of benzodiazepines.  All refills should be approved by this provider, or covering partner.    Medication(s): clonazepam 1 mg BID, Gabapentin 600 mg  Maximum quantity per month: 60  Clinic visit frequency required: Q 3 months     Controlled substance agreement on file: No  Benzodiazepine use reviewed by psychiatry:  No    Last [facility] website verification: 6/11/2019 recommend provider review      Previous failed medications: celexa (nausea), effexor, zoloft, paxil   checked in past 3 months?  Yes 06/11/2019

## 2019-08-01 NOTE — TELEPHONE ENCOUNTER
Patient calling to say he will be out of medication this weekend. Needs it sent to pharmacy. Call patient to let him know

## 2019-08-01 NOTE — TELEPHONE ENCOUNTER
Not due for a refill until the 5th, if he is going to be out this weekend he is not taking it as prescribed.  Has to wait to 5th and follow up with JRB for anymore refills.

## 2019-08-01 NOTE — TELEPHONE ENCOUNTER
Called patient and informed him that he can  his Rx on Monday, 8/5 at his [facility]. Pt acknowledges this.

## 2019-08-13 ENCOUNTER — OFFICE VISIT (OUTPATIENT)
Dept: FAMILY MEDICINE | Facility: CLINIC | Age: 34
End: 2019-08-13
Payer: COMMERCIAL

## 2019-08-13 VITALS
DIASTOLIC BLOOD PRESSURE: 80 MMHG | SYSTOLIC BLOOD PRESSURE: 130 MMHG | HEART RATE: 69 BPM | RESPIRATION RATE: 16 BRPM | OXYGEN SATURATION: 96 %

## 2019-08-13 DIAGNOSIS — F33.42 RECURRENT MAJOR DEPRESSION IN COMPLETE REMISSION (H): Primary | ICD-10-CM

## 2019-08-13 DIAGNOSIS — F41.1 GENERALIZED ANXIETY DISORDER: ICD-10-CM

## 2019-08-13 DIAGNOSIS — F17.200 TOBACCO USE DISORDER: ICD-10-CM

## 2019-08-13 PROCEDURE — 99214 OFFICE O/P EST MOD 30 MIN: CPT | Performed by: FAMILY MEDICINE

## 2019-08-13 RX ORDER — CLONAZEPAM 2 MG/1
2 TABLET ORAL
Qty: 60 TABLET | Refills: 0 | Status: SHIPPED | OUTPATIENT
Start: 2019-08-13 | End: 2019-09-10

## 2019-08-13 NOTE — PROGRESS NOTES
Subjective     Ramon Jamison is a 34 year old male who presents to clinic today for the following health issues:    HPI   Medication Followup of clonazepam    Taking Medication as prescribed: yes    Side Effects:  None    Medication Helping Symptoms:  Yes    Pt is going on vacation and would like to discuss refilling medication a little early     Depression and Anxiety Follow-Up    How are you doing with your depression since your last visit? No change    How are you doing with your anxiety since your last visit?  No change    Are you having other symptoms that might be associated with depression or anxiety? No    Have you had a significant life event? No     Do you have any concerns with your use of alcohol or other drugs? No    Social History     Tobacco Use     Smoking status: Current Every Day Smoker     Packs/day: 0.50     Years: 7.00     Pack years: 3.50     Types: Cigarettes     Smokeless tobacco: Never Used   Substance Use Topics     Alcohol use: Yes     Alcohol/week: 0.0 oz     Comment: once monthly     Drug use: No     PHQ 12/17/2018 4/1/2019 5/13/2019   PHQ-9 Total Score 0 6 1   Q9: Thoughts of better off dead/self-harm past 2 weeks Not at all Not at all Not at all     TAMMY-7 SCORE 4/1/2019 5/13/2019 5/13/2019   Total Score 6 (mild anxiety) - 1 (minimal anxiety)   Total Score 6 1 1           Suicide Assessment Five-step Evaluation and Treatment (SAFE-T)    Patient Active Problem List   Diagnosis     Generalized anxiety disorder     Social phobia     Tobacco use disorder     Attention deficit hyperactivity disorder (ADHD)     Migraine without status migrainosus, not intractable     Recurrent major depression in complete remission (H)     Past Surgical History:   Procedure Laterality Date     NO HISTORY OF SURGERY         Social History     Tobacco Use     Smoking status: Current Every Day Smoker     Packs/day: 0.50     Years: 7.00     Pack years: 3.50     Types: Cigarettes     Smokeless tobacco: Never Used    Substance Use Topics     Alcohol use: Yes     Alcohol/week: 0.0 oz     Comment: once monthly     Family History   Problem Relation Age of Onset     Unknown/Adopted Mother      Unknown/Adopted Father              Reviewed and updated as needed this visit by Provider         Review of Systems   ROS COMP: Constitutional, HEENT, cardiovascular, pulmonary, gi and gu systems are negative, except as otherwise noted.      Objective    /80   Pulse 69   Resp 16   SpO2 96%   There is no height or weight on file to calculate BMI.  Physical Exam   GENERAL APPEARANCE: healthy, alert and no distress            Assessment & Plan       ICD-10-CM    1. Recurrent major depression in complete remission (H) F33.42    2. Generalized anxiety disorder F41.1 clonazePAM (KLONOPIN) 2 MG tablet   3. Tobacco use disorder F17.200         Tobacco Cessation:   reports that he has been smoking cigarettes.  He has a 3.50 pack-year smoking history. He has never used smokeless tobacco.  Tobacco Cessation Action Plan: Information offered: Patient not interested at this time        Patient Instructions   I refilled his clonazepam.  He will follow-up in 3 months sooner as needed.      Return in about 3 months (around 11/13/2019) for anxiety, depression.    Yeyo Watson MD  [facility]

## 2019-08-26 ENCOUNTER — TELEPHONE (OUTPATIENT)
Dept: FAMILY MEDICINE | Facility: CLINIC | Age: 34
End: 2019-08-26

## 2019-08-26 DIAGNOSIS — F41.1 GENERALIZED ANXIETY DISORDER: ICD-10-CM

## 2019-08-27 RX ORDER — GABAPENTIN 600 MG/1
TABLET ORAL
Qty: 150 TABLET | Refills: 0 | Status: SHIPPED | OUTPATIENT
Start: 2019-08-30 | End: 2019-09-26

## 2019-08-27 NOTE — TELEPHONE ENCOUNTER
Patient is calling to check the status of his medication request and to make sure his prescription goes to his provider Dr Watson.

## 2019-08-27 NOTE — TELEPHONE ENCOUNTER
Patient last filled gabapentin on 8/2 for 150 tablets  RX [place] ([place]) reviewed:  reviewed- recommend provider review; patient frequently requests early refills.     [place]:  [email or web address]

## 2019-08-27 NOTE — TELEPHONE ENCOUNTER
Requested Prescriptions   Pending Prescriptions Disp Refills     gabapentin (NEURONTIN) 600 MG tablet [Pharmacy Med Name: GABAPENTIN 600MG TABLETS]  Last Written Prescription Date:  8/5/19  Last Fill Quantity: 150,  # refills: 0   Last office visit: 8/13/2019 with prescribing provider:  Shirley   Future Office Visit:     150 tablet 0     Sig: TAKE 2 TABLETS BY MOUTH IN THE MORNING AND 3 TABLETS BY MOUTH AT NIGHT. MUST LAST 30 DAYS       There is no refill protocol information for this order        Patient is followed by HEIDI PENN for ongoing prescription of benzodiazepines.  All refills should be approved by this provider, or covering partner.    Medication(s): clonazepam 1 mg BID, Gabapentin 600 mg  Maximum quantity per month: 60  Clinic visit frequency required: Q 3 months     Controlled substance agreement on file: No  Benzodiazepine use reviewed by psychiatry:  No    Last [facility] website verification: 8/27/2019 recommend provider review; patient frequently requests early fills.       Previous failed medications: celexa (nausea), effexor, zoloft, paxil    Routing refill request to provider for review/approval because:  Drug not on the [facility] refill protocol

## 2019-08-28 NOTE — TELEPHONE ENCOUNTER
Pharmacy called to clarify if they can only fill the med 30 days from the last fill. Per chart review, medication has to last the patient 30 days, and no early fills will be allowed.

## 2019-08-29 NOTE — TELEPHONE ENCOUNTER
Pt called requesting clarification on fill date for Gabapentin. Informed pt Rx was approved with fill date 08/30/2019. Advised he call [facility] to verify when Rx is ready for . Pt verbalized agreement with plan.

## 2019-08-30 NOTE — TELEPHONE ENCOUNTER
MPMP shows that last fill was on 8/2/19 so filling today would be early.  Ok only if he is going to be out of town for the holiday weekend.  He should have enough left to last thru 9/1.

## 2019-08-30 NOTE — TELEPHONE ENCOUNTER
Pt  called [facility] to fill his Gabapentin Rx and pharmacist asked him to have providers office call them. Per chart review, PCP approved Rx for gabapentin with start date 08/30/2019. Writer called [facility]. Pharmacist states nurse they had talked to before said Rx should last 30 days. They will not fill today unless further advice by providers. Pharmacist also suggested PCP write on script date pt can fill for future refills. Pt has hx of fillings rxs too soon. Please review  and advice if if ok to have pt fill rx today. Pt is expecting a call back with plan.

## 2019-08-30 NOTE — TELEPHONE ENCOUNTER
"Dr Watson,    [facility] is requesting that you write a release date on [place] controlled substances.  He calls them everyday and asks to fill early. They need a clear release date and he needs to stick to it.    Mckayla Burris RN- Triage [employer]                I spoke to Ramon and explained that if he fills early every month he will get an extra months refills and this isn't healthy and it also will make insurance flag him.      He kept saying \"I don't understand\".  I will call pharmacy and let them know ok to fill on the 2nd.The pharmacy told me they will fill the 1st as Monday is labor day.    Mckayla Burris RN- Triage [employer]        "

## 2019-09-09 ENCOUNTER — TELEPHONE (OUTPATIENT)
Dept: FAMILY MEDICINE | Facility: CLINIC | Age: 34
End: 2019-09-09

## 2019-09-09 ENCOUNTER — NURSE TRIAGE (OUTPATIENT)
Dept: NURSING | Facility: CLINIC | Age: 34
End: 2019-09-09

## 2019-09-09 DIAGNOSIS — F41.1 GENERALIZED ANXIETY DISORDER: ICD-10-CM

## 2019-09-09 NOTE — TELEPHONE ENCOUNTER
Reason for Call:  Medication or medication refill:    Do you use a [facility]?  Name of the pharmacy and phone number for the current request:     [id number] - [place], [place] - [address] AT [facility] [address]      Name of the medication requested: clonazePAM (KLONOPIN) 2 MG tablet    Other request: Please call when ready. Patient states he needs to  at aitainment. Patient states he is almost out of medication    Can we leave a detailed message on this number? YES    Phone number patient can be reached at: Home number on file [phone] (home)    Best Time: any    Call taken on 9/9/2019 at 11:41 AM by VISHNU CEVALLOS

## 2019-09-09 NOTE — TELEPHONE ENCOUNTER
"Caller is inquiring if his  RX for Klonopin is ready yet  Caller states this is an essential mediction that he takes for \"seizures\"   Caller is not out of the medication   Caller advised that refill request is following usual refill protocol and refill is pending    Caller is inquiring if his provider is working late and maybe will refill it yet tonight   Caller advised to check with his pharmacy      Caller advised to call clinic tomorrow     Will route  Triage note to  PCP as an FYI   Aylin Carbajal RN  [facility]    Reason for Disposition    Caller requesting a NON-URGENT new prescription or refill and triager unable to refill per unit policy    Protocols used: MEDICATION QUESTION CALL-A-AH      "

## 2019-09-10 RX ORDER — CLONAZEPAM 2 MG/1
2 TABLET ORAL
Qty: 60 TABLET | Refills: 0 | Status: SHIPPED | OUTPATIENT
Start: 2019-09-11 | End: 2019-09-25

## 2019-09-10 RX ORDER — CLONAZEPAM 2 MG/1
TABLET ORAL
Qty: 60 TABLET | Refills: 0 | OUTPATIENT
Start: 2019-09-10

## 2019-09-10 NOTE — TELEPHONE ENCOUNTER
Per chart review, medication refill given with start date 9/11. Patient had been requesting early fill, routing to PCP to advise.

## 2019-09-10 NOTE — TELEPHONE ENCOUNTER
Controlled Substance Refill Request for clonazePAM (KLONOPIN) 2 MG tablet  Problem List Complete:  Yes    Patient is followed by HEIDI PENN for ongoing prescription of benzodiazepines.  All refills should be approved by this provider, or covering partner.    Medication(s): clonazepam 1 mg BID, Gabapentin 600 mg  Maximum quantity per month: 60  Clinic visit frequency required: Q 3 months     Controlled substance agreement on file: No  Benzodiazepine use reviewed by psychiatry:  No    Last [facility] website verification: 8/27/2019 recommend provider review; frequently requests early fills.       Previous failed medications: celexa (nausea), effexor, zoloft, paxil     checked in past 3 months?  Yes 9/10/2019, recommend provider review, frequent early refill requests     Routing refill request to provider for review/approval because:  Drug not on the [facility] refill protocol

## 2019-09-10 NOTE — TELEPHONE ENCOUNTER
Patient calling to check on the status of refill. Patient would like to get it filled on the 28th day. Please call patient. He wants to get it filled 2 days early as he wants to make sure he doesn't run out.

## 2019-09-10 NOTE — TELEPHONE ENCOUNTER
Requested Prescriptions   Pending Prescriptions Disp Refills     clonazePAM (KLONOPIN) 2 MG tablet [Pharmacy Med Name: CLONAZEPAM 2MG TABLETS] 60 tablet 0     Sig: TAKE 1 TABLET(2 MG) BY MOUTH TWICE DAILY         Last Written Prescription Date:  8/13/19  Last Fill Quantity: 60 tab,   # refills: 0  Last Office Visit: 08/13/19  Shirley  Future Office visit:       Routing refill request to provider for review/approval because:  Drug not on the [facility] refill protocol or controlled substance      There is no refill protocol information for this order

## 2019-09-10 NOTE — TELEPHONE ENCOUNTER
Called patient and relayed message. Patient requested that writer call pharmacy and make sure that they would be able to fill this tomorrow. Left VM message with pharmacy to call back if unable to fill 9/11.

## 2019-09-10 NOTE — TELEPHONE ENCOUNTER
Call from patient today, requesting early refill for Klonopin medication.     He was given a 30 day supply and today is day 28. This would be 2 days early.     Huddle with PCP, No early refills for this medication.     Pharmacy was updated, able refill 9/12/2019. New prescription is needed, this was pended and sent to provider in a separate encounter.

## 2019-09-10 NOTE — TELEPHONE ENCOUNTER
This is a duplicate encounter for this medication refill. Please discard and close.         CAN NOT FILL MEDICATION UNTIL 9/12, PER PROVIDER NOTE IN TRIAGE NOTE 9/10/2019

## 2019-09-10 NOTE — TELEPHONE ENCOUNTER
Pharmacy was updated with provider message. This message will be closed. There is a separate refill request for this medication that will be sent to PCP for a signature.

## 2019-09-15 ENCOUNTER — TRANSFERRED RECORDS (OUTPATIENT)
Dept: HEALTH INFORMATION MANAGEMENT | Facility: CLINIC | Age: 34
End: 2019-09-15

## 2019-09-25 ENCOUNTER — TELEPHONE (OUTPATIENT)
Dept: FAMILY MEDICINE | Facility: CLINIC | Age: 34
End: 2019-09-25

## 2019-09-25 ENCOUNTER — OFFICE VISIT (OUTPATIENT)
Dept: FAMILY MEDICINE | Facility: CLINIC | Age: 34
End: 2019-09-25
Payer: COMMERCIAL

## 2019-09-25 ENCOUNTER — NURSE TRIAGE (OUTPATIENT)
Dept: NURSING | Facility: CLINIC | Age: 34
End: 2019-09-25

## 2019-09-25 VITALS
HEIGHT: 73 IN | DIASTOLIC BLOOD PRESSURE: 88 MMHG | HEART RATE: 80 BPM | TEMPERATURE: 97.8 F | OXYGEN SATURATION: 99 % | RESPIRATION RATE: 14 BRPM | SYSTOLIC BLOOD PRESSURE: 142 MMHG | BODY MASS INDEX: 19 KG/M2

## 2019-09-25 DIAGNOSIS — F17.200 TOBACCO USE DISORDER: ICD-10-CM

## 2019-09-25 DIAGNOSIS — F41.1 GENERALIZED ANXIETY DISORDER: ICD-10-CM

## 2019-09-25 DIAGNOSIS — F41.1 GENERALIZED ANXIETY DISORDER: Primary | ICD-10-CM

## 2019-09-25 PROCEDURE — 99213 OFFICE O/P EST LOW 20 MIN: CPT | Performed by: FAMILY MEDICINE

## 2019-09-25 RX ORDER — CLONAZEPAM 2 MG/1
2 TABLET ORAL 3 TIMES DAILY
Qty: 90 TABLET | Refills: 0 | Status: CANCELLED | OUTPATIENT
Start: 2019-09-25

## 2019-09-25 RX ORDER — CLONAZEPAM 2 MG/1
2 TABLET ORAL
Qty: 60 TABLET | Refills: 0 | Status: SHIPPED | OUTPATIENT
Start: 2019-09-25 | End: 2019-10-23

## 2019-09-25 RX ORDER — CLONAZEPAM 2 MG/1
2 TABLET ORAL
Qty: 60 TABLET | Refills: 0 | Status: SHIPPED | OUTPATIENT
Start: 2019-09-25 | End: 2019-09-25

## 2019-09-25 NOTE — PROGRESS NOTES
Subjective     Ramon Jamison is a 34 year old male who presents to clinic today for the following health issues:    HPI   Anxiety Follow-Up    How are you doing with your anxiety since your last visit? Worsened, or no change in the patient says he got a brand of clonazepam that he thinks does not work.  He says the only brand that does work as the [employer] brand.  He is pretty hyper today.    Are you having other symptoms that might be associated with anxiety? No    Have you had a significant life event? No     Are you feeling depressed? No    Do you have any concerns with your use of alcohol or other drugs? No    Social History     Tobacco Use     Smoking status: Current Every Day Smoker     Packs/day: 0.50     Years: 7.00     Pack years: 3.50     Types: Cigarettes     Smokeless tobacco: Never Used   Substance Use Topics     Alcohol use: Yes     Alcohol/week: 0.0 standard drinks     Comment: once monthly     Drug use: No     TAMMY-7 SCORE 4/1/2019 5/13/2019 5/13/2019   Total Score 6 (mild anxiety) - 1 (minimal anxiety)   Total Score 6 1 1     PHQ 12/17/2018 4/1/2019 5/13/2019   PHQ-9 Total Score 0 6 1   Q9: Thoughts of better off dead/self-harm past 2 weeks Not at all Not at all Not at all     Last PHQ-9 5/13/2019   1.  Little interest or pleasure in doing things 0   2.  Feeling down, depressed, or hopeless 0   3.  Trouble falling or staying asleep, or sleeping too much 1   4.  Feeling tired or having little energy 0   5.  Poor appetite or overeating 0   6.  Feeling bad about yourself 0   7.  Trouble concentrating 0   8.  Moving slowly or restless 0   Q9: Thoughts of better off dead/self-harm past 2 weeks 0   PHQ-9 Total Score 1     Labs - TAVR Latest Ref Rng & Units 5/2/2011   WBC 4.0 - 11.0 10e9/L 6.0   HEMOGLOBIN 13.3 - 17.7 g/dL 14.8   HEMATOCRIT 40.0 - 53.0 % 44.0   MCV 78 - 100 fl 87   MCH 26.5 - 33.0 pg 29.1   MCHC 31.5 - 36.5 g/dL 33.6   RDW 10.0 - 15.0 % 13.9   PLATELET COUNT 150 - 450 10e9/L 235   CREATININE  "0.66 - 1.25 mg/dL 0.92   GFR ESTIMATE >60 mL/min/1.7m2 >90           How many servings of fruits and vegetables do you eat daily?  2-3    On average, how many sweetened beverages do you drink each day (soda, juice, sweet tea, etc)?   6    How many days per week do you miss taking your medication? 0            Patient Active Problem List   Diagnosis     Generalized anxiety disorder     Social phobia     Tobacco use disorder     Attention deficit hyperactivity disorder (ADHD)     Migraine without status migrainosus, not intractable     Recurrent major depression in complete remission (H)     Benzodiazepine dependence (H)     Past Surgical History:   Procedure Laterality Date     NO HISTORY OF SURGERY         Social History     Tobacco Use     Smoking status: Current Every Day Smoker     Packs/day: 0.50     Years: 7.00     Pack years: 3.50     Types: Cigarettes     Smokeless tobacco: Never Used   Substance Use Topics     Alcohol use: Yes     Alcohol/week: 0.0 standard drinks     Comment: once monthly     Family History   Problem Relation Age of Onset     Unknown/Adopted Mother      Unknown/Adopted Father              Reviewed and updated as needed this visit by Provider         Review of Systems   ROS COMP: Constitutional, HEENT, cardiovascular, pulmonary, gi and gu systems are negative, except as otherwise noted.      Objective    BP (!) 142/88   Pulse 80   Temp 97.8  F (36.6  C) (Tympanic)   Resp 14   Ht 1.854 m (6' 1\")   SpO2 99%   BMI 19.00 kg/m    Body mass index is 19 kg/m .  Physical Exam   GENERAL APPEARANCE: healthy, alert, no distress and hyper in his activities today.            Assessment & Plan       ICD-10-CM    1. Generalized anxiety disorder F41.1 DISCONTINUED: clonazePAM (KLONOPIN) 2 MG tablet     DISCONTINUED: clonazePAM (KLONOPIN) 2 MG tablet   2. Tobacco use disorder F17.200         Tobacco Cessation:   reports that he has been smoking cigarettes. He has a 3.50 pack-year smoking history. He " has never used smokeless tobacco.  Tobacco Cessation Action Plan: Information offered: Patient not interested at this time        There are no Patient Instructions on file for this visit.    Return in about 3 months (around 12/25/2019) for anxiety.    Yeyo Watson MD  [facility]

## 2019-09-25 NOTE — TELEPHONE ENCOUNTER
Clinic Action Needed:Yes, follow up with Ramon as needed    Reason for Call: Ramon called nurse chari boyd regarding his Klonopin Rx and refill.  I had a hard time following everything that Ramon was saying.  I called pharmacy and spoke to pharmacist Clay at [facility] on [address] in [place], he asked that I call the [facility] in [place] on [address] at [phone] as that is where Ramon has been filling scripts recently.  Today, Ramon tried filling the new Klonopin Rx at [facility] at [address] and pay cash for it.  It is way too early for him to be filling, he is on State Assistance, so he is not allowed to pay cash for this medication.    I called and spoke to pharmacy sachi Carvajal at [facility] in [place] on [address].  He told me that Ramon is well known in the [facility] system, that they know him just by his voice. He also reports that the [facility] will no longer fill for him given his history.  This month he started calling [place] on September 6th checking to see when he could fill his Klonopin Rx and he called them daily until September 12th.  He kept asking if they had Myelin brand, which they did.  He finally filled his Rx on September 12th and records show that Myelin brand was dispensed. Ramon is insisting he didn't get Myelin brand.  Pharmacy is willing to exchange his Rx and fill with Myelin if he brings his pills back from September 12th fill and if they are indeed the wrong brand.  Ramon reports that he doesn't have his old Rx, he has given them several stories about why he can't produce the pills. Lost, threw them away etc...    Again, it's too soon to fill Klonopin and he is not allowed to pay out of pocket as he is on state assistance.    I tried calling Ramon back after speaking to both pharmacies and I received his v.m.  Left general message to call clinic tomorrow during clinic hours if he has any  questions.     Routed to: [facility] Patient Care Team 1    Nia Curry, RN  [facility]

## 2019-09-25 NOTE — TELEPHONE ENCOUNTER
"Huddled with Dr. Watson who said he agrees with patient and said he approves the refill requested: refill today 9/25/19 with brand name \"activis\" or \"myelin\" at [facility] on [address].     [facility] on [address] called spoke to pharmacist Clay. He is saying the patient should go back to [facility] with this approval although they do have \"activis\" in stock.     [facility] in [place] was called they will not fill this. Legally they are not allowed to fill this early with [employer] and will not.    Patient was called. He wanted triage nurse to call in Rx to [facility] in [place]. Spoke to [facility] pharmacist Jaja who said that the only way the patient will have it go thorough insurance is if pt has documentation that the wrong brand was dispensed.     Patient was called. He is unable to prove this because he threw away the bottle.     At this point the patient is asking Dr. Watson to increase his Clonzepam dose to 3 tablets daily (even though 2 daily is enough) to see if new Rx with new directions will go through.     "

## 2019-09-25 NOTE — TELEPHONE ENCOUNTER
Patient called to check on the status of refill. Patient states he is at the pharmacy still waiting. [facility] [address]

## 2019-09-25 NOTE — LETTER
My Depression Action Plan  Name: Ramon Jamison   Date of Birth 1985  Date: 9/25/2019    My doctor: Yeyo Watson   My clinic: [address] [address]  [phone]          GREEN    ZONE   Good Control    What it looks like:     Things are going generally well. You have normal up s and down s. You may even feel depressed from time to time, but bad moods usually last less than a day.   What you need to do:  1. Continue to care for yourself (see self care plan)  2. Check your depression survival kit and update it as needed  3. Follow your physician s recommendations including any medication.  4. Do not stop taking medication unless you consult with your physician first.           YELLOW         ZONE Getting Worse    What it looks like:     Depression is starting to interfere with your life.     It may be hard to get out of bed; you may be starting to isolate yourself from others.    Symptoms of depression are starting to last most all day and this has happened for several days.     You may have suicidal thoughts but they are not constant.   What you need to do:     1. Call your care team, your response to treatment will improve if you keep your care team informed of your progress. Yellow periods are signs an adjustment may need to be made.     2. Continue your self-care, even if you have to fake it!    3. Talk to someone in your support network    4. Open up your depression survival kit           RED    ZONE Medical Alert - Get Help    What it looks like:     Depression is seriously interfering with your life.     You may experience these or other symptoms: You can t get out of bed most days, can t work or engage in other necessary activities, you have trouble taking care of basic hygiene, or basic responsibilities, thoughts of suicide or death that will not go away, self-injurious behavior.     What you need to  do:  1. Call your care team and request a same-day appointment. If they are not available (weekends or after hours) call your local crisis line, emergency room or 911.            Depression Self Care Plan / Survival Kit    Self-Care for Depression  Here s the deal. Your body and mind are really not as separate as most people think.  What you do and think affects how you feel and how you feel influences what you do and think. This means if you do things that people who feel good do, it will help you feel better.  Sometimes this is all it takes.  There is also a place for medication and therapy depending on how severe your depression is, so be sure to consult with your medical provider and/ or Behavioral Health Consultant if your symptoms are worsening or not improving.     In order to better manage my stress, I will:    Exercise  Get some form of exercise, every day. This will help reduce pain and release endorphins, the  feel good  chemicals in your brain. This is almost as good as taking antidepressants!  This is not the same as joining a gym and then never going! (they count on that by the way ) It can be as simple as just going for a walk or doing some gardening, anything that will get you moving.      Hygiene   Maintain good hygiene (Get out of bed in the morning, Make your bed, Brush your teeth, Take a shower, and Get dressed like you were going to work, even if you are unemployed).  If your clothes don't fit try to get ones that do.    Diet  I will strive to eat foods that are good for me, drink plenty of water, and avoid excessive sugar, caffeine, alcohol, and other mood-altering substances.  Some foods that are helpful in depression are: complex carbohydrates, B vitamins, flaxseed, fish or fish oil, fresh fruits and vegetables.    Psychotherapy  I agree to participate in Individual Therapy (if recommended).    Medication  If prescribed medications, I agree to take them.  Missing doses can result in serious  side effects.  I understand that drinking alcohol, or other illicit drug use, may cause potential side effects.  I will not stop my medication abruptly without first discussing it with my provider.    Staying Connected With Others  I will stay in touch with my friends, family members, and my primary care provider/team.    Use your imagination  Be creative.  We all have a creative side; it doesn t matter if it s oil painting, sand castles, or mud pies! This will also kick up the endorphins.    Witness Beauty  (AKA stop and smell the roses) Take a look outside, even in mid-winter. Notice colors, textures. Watch the squirrels and birds.     Service to others  Be of service to others.  There is always someone else in need.  By helping others we can  get out of ourselves  and remember the really important things.  This also provides opportunities for practicing all the other parts of the program.    Humor  Laugh and be silly!  Adjust your TV habits for less news and crime-drama and more comedy.    Control your stress  Try breathing deep, massage therapy, biofeedback, and meditation. Find time to relax each day.     My support system    Clinic Contact:  Phone number:    Contact 1:  Phone number:    Contact 2:  Phone number:    Faith/:  Phone number:    Therapist:  Phone number:    Local crisis center:    Phone number:    Other community support:  Phone number:

## 2019-09-25 NOTE — TELEPHONE ENCOUNTER
"Patient called the clinic requesting okay to fill clonazepam 2 mg dated 9/25/19. The reason is because the pharmacy filled the wrong brand \"accord\" on 9/12/19 and he wants a replacement with brand \"activis.\" Per patient \"accord\" is not working as well and he will destroy these pills.     [facility] They said that patient has been taking clonazepam with brand \"myelin\" all along and last Rx 9/12/19 was filled as \"myelin.\" They are not sure if what was documented is different than what was dispensed on 9/12/19. This was at [facility].     Pharmacy would need Dr. Watson's approval or denial to fill clonazepam today.     Patient is at the pharmacy waiting.       "

## 2019-09-25 NOTE — TELEPHONE ENCOUNTER
Patient called again asking about the status of this request. Advised that we are still waiting for PCP to respond.

## 2019-09-25 NOTE — TELEPHONE ENCOUNTER
"[facility] was called. They have double checked before they dispensed 9/12/19 Clonazepam that is was Myelin brand. Pt told the pharmacy that he destroyed the bottle because it did not work. Pt told triage that he has the bottle at home. He refuses to bring the bottle to the [facility] pharmacy to prove he was was dispensed as \"activis\" and not \"myelin.\"     [facility] said that pt usually calls several days in a row before a medication is due to make sure he gets it correct and there is no way he would have waited so long if it was not correct.     Per [facility] the insurance wont cover this and he can not pay out of packet. Per patient, he is able to pay out of pocket if provider approves.     Patient said that the clinic should not trust the [facility] and that they are just trying to get him and play a game.     Patient was advised that we are not playing a game and this is a controlled substance and we are just trying to do a good job.     Does Dr. Watson still want to approve pt get another refill today with brand name \"activis\" at [facility] on [address]?       "

## 2019-09-26 RX ORDER — CLONAZEPAM 2 MG/1
2 TABLET ORAL
Qty: 60 TABLET | Refills: 0 | OUTPATIENT
Start: 2019-09-26 | End: 2024-08-08

## 2019-09-26 NOTE — TELEPHONE ENCOUNTER
Patient was called back and given provider information.     Appointment was cancelled for today.     States that he does continue to plan on looking for a pharmacy to fill the medication that PCP approved 9/25/2019. He was told the provider meeting with him today for OV will not fill prescription for patient.     Patient states that he plans to still call and find a pharmacy to fill the prescription that was okayed by PCP yesterday.     Patient states he spoke with [facility] [phone] and they would fill it if a provider sent new prescription over to the pharmacy.     All 4 providers in the facility were asked if they would sign off on this prescription for patient and they would not stating he will need to wait until his PCP is back in office.

## 2019-09-26 NOTE — TELEPHONE ENCOUNTER
"Patient calls again re: Klonopin order.  FNA advised previous FNA has routed message to his clinic about this issue.   FNA advised patient to call his clinic tomorrow.  Caller verbalizes understanding.    Reason for Disposition    Caller requesting a NON-URGENT new prescription or refill and triager unable to refill per unit policy    Additional Information    Negative: Drug overdose and nurse unable to answer question    Negative: Caller requesting information not related to medicine    Negative: Caller requesting a prescription for Strep throat and has a positive culture result    Negative: Rash while taking a medication or within 3 days of stopping it    Negative: Immunization reaction suspected    Negative: [1] Asthma and [2] having symptoms of asthma (cough, wheezing, etc)    Negative: MORE THAN A DOUBLE DOSE of a prescription or over-the-counter (OTC) drug    Negative: [1] DOUBLE DOSE (an extra dose or lesser amount) of over-the-counter (OTC) drug AND [2] any symptoms (e.g., dizziness, nausea, pain, sleepiness)    Negative: [1] DOUBLE DOSE (an extra dose or lesser amount) of prescription drug AND [2] any symptoms (e.g., dizziness, nausea, pain, sleepiness)    Negative: Took another person's prescription drug    Negative: [1] DOUBLE DOSE (an extra dose or lesser amount) of prescription drug AND [2] NO symptoms (Exception: a double dose of antibiotics)    Negative: Diabetes drug error or overdose (e.g., insulin or extra dose)    Negative: [1] Request for URGENT new prescription or refill of \"essential\" medication (i.e., likelihood of harm to patient if not taken) AND [2] triager unable to fill per unit policy    Negative: [1] Prescription not at pharmacy AND [2] was prescribed today by PCP    Negative: Pharmacy calling with prescription questions and triager unable to answer question    Negative: Caller has URGENT medication question about med that PCP prescribed and triager unable to answer question    " Negative: Caller has NON-URGENT medication question about med that PCP prescribed and triager unable to answer question    Protocols used: MEDICATION QUESTION CALL-A-AH

## 2019-09-26 NOTE — TELEPHONE ENCOUNTER
Patient was updated that none of the providers in the facility are willing to sign this prescription.     PCP was sent high priority request per the PCP per patient request to see if he will sign off on this prescription. Patient was told PCP out of office so unable to give a time frame on this medication.     Last prescription was denied by the pharmacy so a new one is needed.

## 2019-09-26 NOTE — TELEPHONE ENCOUNTER
Please let pt know, he has an appointment today:    See other phone notes, pt can't fill prescription because it is too early and he doesn't have his own pills to return.  I will not give him a new prescription and will not increase his dose to tid, that needs to go through Dr. TIPTON.  If he comes in for his appointment, I will not be giving him any medication refills.    If he is going through withdrawal, he needs to go to the ED.

## 2019-09-29 ENCOUNTER — HEALTH MAINTENANCE LETTER (OUTPATIENT)
Age: 34
End: 2019-09-29

## 2019-10-02 ENCOUNTER — TRANSFERRED RECORDS (OUTPATIENT)
Dept: HEALTH INFORMATION MANAGEMENT | Facility: CLINIC | Age: 34
End: 2019-10-02

## 2019-10-22 DIAGNOSIS — F41.1 GENERALIZED ANXIETY DISORDER: ICD-10-CM

## 2019-10-22 NOTE — TELEPHONE ENCOUNTER
Controlled Substance Refill Request for clonazePAM (KLONOPIN) 2 MG tablet  Problem List Complete:  Yes    Patient is followed by HEIDI PENN for ongoing prescription of benzodiazepines.  All refills should be approved by this provider, or covering partner.     Medication(s): clonazepam 1 mg BID, Gabapentin 600 mg  Maximum quantity per month: 60  Clinic visit frequency required: Q 3 months      Controlled substance agreement on file: No  Benzodiazepine use reviewed by psychiatry:  No     Last [facility] website verification: 8/27/2019 recommend provider review; frequently requests early fills.         Previous failed medications: celexa (nausea), effexor, zoloft, paxil    Last Written Prescription Date:  9/25/2019  Last Fill Quantity: 60 tablet,  # refills: 0   Last office visit: 9/25/2019 with prescribing provider:  Shirley   Future Office Visit:        Controlled substance agreement:   Encounter-Level CSA:    There are no encounter-level csa.     Patient-Level CSA:    Controlled Substance Agreement - Non - Opioid - Scan on 5/16/2019 11:50 AM: NON-OPIOID CONTROLLED SUBSTANCE AGREEMENT         Last Urine Drug Screen: No results found for: CDAUT, No results found for: COMDAT, No results found for: THC13, PCP13, COC13, MAMP13, OPI13, AMP13, BZO13, TCA13, MTD13, BAR13, OXY13, PPX13, BUP13         [email or web address]   checked in past 3 months?  Yes 8/27/2019

## 2019-10-22 NOTE — TELEPHONE ENCOUNTER
Reason for Call:  Medication or medication refill:    Do you use a [facility]?  Name of the pharmacy and phone number for the current request:     [id number] - [place], [place] - [address] AT [facility] [address] [place] [address]    Name of the medication requested: clonazePAM (KLONOPIN) 2 MG tablet       Other request: The patient called and stated he would like this medication refilled. He stated that he is not currently out and is aware that it will not be able to be refilled until 10/25.    Can we leave a detailed message on this number? YES    Phone number patient can be reached at: Cell number on file:    Telephone Information:   Mobile [phone]       Best Time: Any    Call taken on 10/22/2019 at 3:15 PM by Maryana Vanegas

## 2019-10-23 RX ORDER — CLONAZEPAM 2 MG/1
2 TABLET ORAL
Qty: 60 TABLET | Refills: 0 | Status: SHIPPED | OUTPATIENT
Start: 2019-10-25 | End: 2019-11-19

## 2019-10-23 NOTE — TELEPHONE ENCOUNTER
Ramon phoned to say he will be out of medication this weekend.  He requests it be filled yet today while Dr. Watson is in the office.

## 2019-11-18 DIAGNOSIS — F41.1 GENERALIZED ANXIETY DISORDER: ICD-10-CM

## 2019-11-18 NOTE — TELEPHONE ENCOUNTER
Controlled Substance Refill Request for clonazePAM (KLONOPIN) 2 MG tablet  Problem List Complete:  Yes    Patient is followed by [facility] for ongoing prescription of benzodiazepines.  All refills should be approved by this provider, or covering partner.     Medication(s): clonazepam 1 mg BID, Gabapentin 600 mg  Maximum quantity per month: 60  Clinic visit frequency required: Q 3 months      Controlled substance agreement on file: No  Benzodiazepine use reviewed by psychiatry:  No     Last [facility] website verification:10/28/2019 recommend provider review; new prescriber on file for tylenol #3        Previous failed medications: celexa (nausea), effexor, zoloft, paxil    Last Written Prescription Date:  10/25/2019  Last Fill Quantity: 60 tablet,  # refills: 0   Last office visit: 9/25/2019 with prescribing provider:  Shirley   Future Office Visit:        Controlled substance agreement:   Encounter-Level CSA:    There are no encounter-level csa.     Patient-Level CSA:    Controlled Substance Agreement - Non - Opioid - Scan on 5/16/2019 11:50 AM: NON-OPIOID CONTROLLED SUBSTANCE AGREEMENT         Last Urine Drug Screen: No results found for: CDAUT, No results found for: COMDAT, No results found for: THC13, PCP13, COC13, MAMP13, OPI13, AMP13, BZO13, TCA13, MTD13, BAR13, OXY13, PPX13, BUP13         [email or web address]   checked in past 3 months?  Yes 10/28/2019

## 2019-11-18 NOTE — TELEPHONE ENCOUNTER
Reason for Call:  Medication or medication refill:    Do you use a [facility]?  Name of the pharmacy and phone number for the current request:   [facility] [address], [place]    Name of the medication requested:   clonazePAM (KLONOPIN) 2 MG tablet    Other request:   He will be out of medication over the weekend.    Can we leave a detailed message on this number? YES    Phone number patient can be reached at:  [phone]    Best Time: any    Call taken on 11/18/2019 at 1:41 PM by Carolina Hamilton

## 2019-11-19 RX ORDER — CLONAZEPAM 2 MG/1
2 TABLET ORAL
Qty: 60 TABLET | Refills: 0 | Status: SHIPPED | OUTPATIENT
Start: 2019-11-22 | End: 2019-12-14

## 2019-11-19 NOTE — TELEPHONE ENCOUNTER
[email or web address]   checked in past 3 months?  Yes 10/28/2019    Routing refill request to provider for review/approval because:  Drug not on the [facility] refill protocol     GERA Mauro, RN  [employer] [facility] [facility]

## 2019-12-02 ENCOUNTER — TELEPHONE (OUTPATIENT)
Dept: FAMILY MEDICINE | Facility: CLINIC | Age: 34
End: 2019-12-02

## 2019-12-02 ENCOUNTER — OFFICE VISIT (OUTPATIENT)
Dept: FAMILY MEDICINE | Facility: CLINIC | Age: 34
End: 2019-12-02
Payer: COMMERCIAL

## 2019-12-02 VITALS
RESPIRATION RATE: 18 BRPM | HEART RATE: 78 BPM | SYSTOLIC BLOOD PRESSURE: 146 MMHG | OXYGEN SATURATION: 98 % | DIASTOLIC BLOOD PRESSURE: 88 MMHG | TEMPERATURE: 98.8 F

## 2019-12-02 DIAGNOSIS — F41.1 GENERALIZED ANXIETY DISORDER: Primary | ICD-10-CM

## 2019-12-02 DIAGNOSIS — R03.0 ELEVATED BLOOD PRESSURE READING WITHOUT DIAGNOSIS OF HYPERTENSION: ICD-10-CM

## 2019-12-02 PROCEDURE — 99213 OFFICE O/P EST LOW 20 MIN: CPT | Performed by: FAMILY MEDICINE

## 2019-12-02 RX ORDER — BUPROPION HYDROCHLORIDE 150 MG/1
150 TABLET ORAL EVERY MORNING
Qty: 90 TABLET | Refills: 3 | Status: SHIPPED | OUTPATIENT
Start: 2019-12-02 | End: 2021-03-11

## 2019-12-02 RX ORDER — LORAZEPAM 1 MG/1
1 TABLET ORAL EVERY 8 HOURS PRN
Qty: 50 TABLET | Refills: 0 | Status: SHIPPED | OUTPATIENT
Start: 2019-12-02 | End: 2019-12-31

## 2019-12-02 NOTE — PATIENT INSTRUCTIONS
I declined to refill his clonazepam early.  I will have him increase his Wellbutrin to 300 mg daily.  He will follow-up in 1 month.  In the meantime he will use lorazepam 1 mg tablets up to every 8 hours as needed and I gave him 50 tablets which should last him until January.  Appointment was made for January.  We again discussed that he cannot take increased doses of his medications without first checking with us to see if that is allowable or not.  Regardless of his stress level we did have that discussion.  His mother, as noted, is having a brain aneurysm operated on this week.  He is also having another child soon.

## 2019-12-02 NOTE — TELEPHONE ENCOUNTER
No issue at pharmacy. Ramon was not being patient with the pharmacy in filling this for him. They literally just received the Rx.

## 2019-12-02 NOTE — PROGRESS NOTES
Subjective     Ramon Jamison is a 34 year old male who presents to clinic today for the following health issues:    HPI   Anxiety Follow-Up    How are you doing with your anxiety since your last visit? Worsened slightly in some circumstances. Overall the same    Are you having other symptoms that might be associated with anxiety? No    Have you had a significant life event? OTHER: Mother having surgery for brain aneurysm the second time    Are you feeling depressed? No    Do you have any concerns with your use of alcohol or other drugs? No     Patient has run out of his clonazepam.  He has been using 3 daily a number of days due to increased stress.    Social History     Tobacco Use     Smoking status: Current Every Day Smoker     Packs/day: 0.50     Years: 7.00     Pack years: 3.50     Types: Cigarettes     Smokeless tobacco: Never Used   Substance Use Topics     Alcohol use: Yes     Alcohol/week: 0.0 standard drinks     Comment: once monthly     Drug use: No     TAMMY-7 SCORE 4/1/2019 5/13/2019 5/13/2019   Total Score 6 (mild anxiety) - 1 (minimal anxiety)   Total Score 6 1 1     PHQ 12/17/2018 4/1/2019 5/13/2019   PHQ-9 Total Score 0 6 1   Q9: Thoughts of better off dead/self-harm past 2 weeks Not at all Not at all Not at all           How many servings of fruits and vegetables do you eat daily?  0-1    On average, how many sweetened beverages do you drink each day (Examples: soda, juice, sweet tea, etc.  Do NOT count diet or artificially sweetened beverages)?   6    How many days per week do you miss taking your medication? 0        Patient Active Problem List   Diagnosis     Generalized anxiety disorder     Social phobia     Tobacco use disorder     Attention deficit hyperactivity disorder (ADHD)     Migraine without status migrainosus, not intractable     Recurrent major depression in complete remission (H)     Benzodiazepine dependence (H)     Past Surgical History:   Procedure Laterality Date     NO HISTORY OF  SURGERY         Social History     Tobacco Use     Smoking status: Current Every Day Smoker     Packs/day: 0.50     Years: 7.00     Pack years: 3.50     Types: Cigarettes     Smokeless tobacco: Never Used   Substance Use Topics     Alcohol use: Yes     Alcohol/week: 0.0 standard drinks     Comment: once monthly     Family History   Problem Relation Age of Onset     Unknown/Adopted Mother      Unknown/Adopted Father              Reviewed and updated as needed this visit by Provider         Review of Systems   ROS COMP: Constitutional, HEENT, cardiovascular, pulmonary, gi and gu systems are negative, except as otherwise noted.      Objective    BP (!) 146/88 (BP Location: Right arm, Patient Position: Chair, Cuff Size: Adult Regular)   Pulse 78   Temp 98.8  F (37.1  C) (Oral)   Resp 18   SpO2 98%   There is no height or weight on file to calculate BMI.  Physical Exam   GENERAL APPEARANCE: healthy, alert and no distress            Assessment & Plan       ICD-10-CM    1. Generalized anxiety disorder F41.1 buPROPion (WELLBUTRIN XL) 150 MG 24 hr tablet     LORazepam (ATIVAN) 1 MG tablet        Tobacco Cessation:   reports that he has been smoking cigarettes. He has a 3.50 pack-year smoking history. He has never used smokeless tobacco.  Tobacco Cessation Action Plan: Information offered: Patient not interested at this time        Patient Instructions   I declined to refill his clonazepam early.  I will have him increase his Wellbutrin to 300 mg daily.  He will follow-up in 1 month.  In the meantime he will use lorazepam 1 mg tablets up to every 8 hours as needed and I gave him 50 tablets which should last him until January.  Appointment was made for January.  We again discussed that he cannot take increased doses of his medications without first checking with us to see if that is allowable or not.  Regardless of his stress level we did have that discussion.  His mother, as noted, is having a brain aneurysm operated on  this week.  He is also having another child soon.      Return in about 4 weeks (around 12/30/2019) for anxiety.    Yeyo Watson MD  [facility]

## 2019-12-31 ENCOUNTER — OFFICE VISIT (OUTPATIENT)
Dept: FAMILY MEDICINE | Facility: CLINIC | Age: 34
End: 2019-12-31
Payer: COMMERCIAL

## 2019-12-31 VITALS
OXYGEN SATURATION: 99 % | RESPIRATION RATE: 18 BRPM | BODY MASS INDEX: 19 KG/M2 | HEIGHT: 73 IN | DIASTOLIC BLOOD PRESSURE: 78 MMHG | HEART RATE: 80 BPM | SYSTOLIC BLOOD PRESSURE: 160 MMHG | TEMPERATURE: 99.7 F

## 2019-12-31 DIAGNOSIS — F41.1 GENERALIZED ANXIETY DISORDER: ICD-10-CM

## 2019-12-31 PROCEDURE — 99213 OFFICE O/P EST LOW 20 MIN: CPT | Performed by: FAMILY MEDICINE

## 2019-12-31 RX ORDER — LORAZEPAM 1 MG/1
1 TABLET ORAL EVERY 8 HOURS PRN
Qty: 50 TABLET | Refills: 0 | Status: SHIPPED | OUTPATIENT
Start: 2019-12-31 | End: 2020-01-15

## 2019-12-31 ASSESSMENT — ANXIETY QUESTIONNAIRES
GAD7 TOTAL SCORE: 7
1. FEELING NERVOUS, ANXIOUS, OR ON EDGE: SEVERAL DAYS
IF YOU CHECKED OFF ANY PROBLEMS ON THIS QUESTIONNAIRE, HOW DIFFICULT HAVE THESE PROBLEMS MADE IT FOR YOU TO DO YOUR WORK, TAKE CARE OF THINGS AT HOME, OR GET ALONG WITH OTHER PEOPLE: NOT DIFFICULT AT ALL
6. BECOMING EASILY ANNOYED OR IRRITABLE: SEVERAL DAYS
7. FEELING AFRAID AS IF SOMETHING AWFUL MIGHT HAPPEN: SEVERAL DAYS
3. WORRYING TOO MUCH ABOUT DIFFERENT THINGS: SEVERAL DAYS
2. NOT BEING ABLE TO STOP OR CONTROL WORRYING: SEVERAL DAYS
5. BEING SO RESTLESS THAT IT IS HARD TO SIT STILL: SEVERAL DAYS

## 2019-12-31 ASSESSMENT — PATIENT HEALTH QUESTIONNAIRE - PHQ9
SUM OF ALL RESPONSES TO PHQ QUESTIONS 1-9: 0
5. POOR APPETITE OR OVEREATING: SEVERAL DAYS

## 2019-12-31 NOTE — PROGRESS NOTES
Subjective     Ramon Jamison is a 34 year old male who presents to clinic today for the following health issues:    HPI   Depression and Anxiety Follow-Up    How are you doing with your depression since your last visit? Improved    How are you doing with your anxiety since your last visit?  Improved    Are you having other symptoms that might be associated with depression or anxiety? No    Have you had a significant life event? No     Do you have any concerns with your use of alcohol or other drugs? No     The patient has again gone through his clonazepam in a much shorter period of time than he is supposed to.  We discussed that this is now happened twice and he will have to find care is somewhere else if it ever happens again.  I did cover him until his next prescription of clonazepam is due with lorazepam.ROS COMP: Constitutional, HEENT, cardiovascular, pulmonary, gi and gu systems are negative, except as otherwise noted.    Social History     Tobacco Use     Smoking status: Current Every Day Smoker     Packs/day: 0.50     Years: 7.00     Pack years: 3.50     Types: Cigarettes     Smokeless tobacco: Never Used   Substance Use Topics     Alcohol use: Yes     Alcohol/week: 0.0 standard drinks     Comment: once monthly     Drug use: No     PHQ 4/1/2019 5/13/2019 12/31/2019   PHQ-9 Total Score 6 1 0   Q9: Thoughts of better off dead/self-harm past 2 weeks Not at all Not at all Not at all     TAMMY-7 SCORE 5/13/2019 5/13/2019 12/31/2019   Total Score - 1 (minimal anxiety) -   Total Score 1 1 7     Last PHQ-9 12/31/2019   1.  Little interest or pleasure in doing things 0   2.  Feeling down, depressed, or hopeless 0   3.  Trouble falling or staying asleep, or sleeping too much 0   4.  Feeling tired or having little energy 0   5.  Poor appetite or overeating 0   6.  Feeling bad about yourself 0   7.  Trouble concentrating 0   8.  Moving slowly or restless 0   Q9: Thoughts of better off dead/self-harm past 2 weeks 0   PHQ-9  Total Score 0   Difficulty at work, home, or with people Not difficult at all     TAMMY-7  12/31/2019   1. Feeling nervous, anxious, or on edge 1   2. Not being able to stop or control worrying 1   3. Worrying too much about different things 1   4. Trouble relaxing 1   5. Being so restless that it is hard to sit still 1   6. Becoming easily annoyed or irritable 1   7. Feeling afraid, as if something awful might happen 1   TAMMY-7 Total Score 7   If you checked any problems, how difficult have they made it for you to do your work, take care of things at home, or get along with other people? Not difficult at all         Suicide Assessment Five-step Evaluation and Treatment (SAFE-T)      How many servings of fruits and vegetables do you eat daily?  2-3    On average, how many sweetened beverages do you drink each day (Examples: soda, juice, sweet tea, etc.  Do NOT count diet or artificially sweetened beverages)?   5    How many days per week do you miss taking your medication? 0        Patient Active Problem List   Diagnosis     Generalized anxiety disorder     Social phobia     Tobacco use disorder     Attention deficit hyperactivity disorder (ADHD)     Migraine without status migrainosus, not intractable     Elevated blood pressure reading without diagnosis of hypertension     Recurrent major depression in complete remission (H)     Benzodiazepine dependence (H)     Past Surgical History:   Procedure Laterality Date     NO HISTORY OF SURGERY         Social History     Tobacco Use     Smoking status: Current Every Day Smoker     Packs/day: 0.50     Years: 7.00     Pack years: 3.50     Types: Cigarettes     Smokeless tobacco: Never Used   Substance Use Topics     Alcohol use: Yes     Alcohol/week: 0.0 standard drinks     Comment: once monthly     Family History   Problem Relation Age of Onset     Unknown/Adopted Mother      Unknown/Adopted Father              Reviewed and updated as needed this visit by Provider  Shine   "       Review of Systems   ROS COMP: Constitutional, HEENT, cardiovascular, pulmonary, gi and gu systems are negative, except as otherwise noted.      Objective    BP (!) 160/78   Pulse 80   Temp 99.7  F (37.6  C) (Tympanic)   Resp 18   Ht 1.854 m (6' 1\")   SpO2 99%   BMI 19.00 kg/m    Body mass index is 19 kg/m .  Physical Exam   GENERAL APPEARANCE: healthy, alert and no distress            Assessment & Plan       ICD-10-CM    1. Generalized anxiety disorder F41.1 LORazepam (ATIVAN) 1 MG tablet          Patient Instructions   We had a very carlos discussion about overuse of his clonazepam.  I have informed him that if the issue ever occurs again he will have to find care someplace else.  I did cover him until his next clonazepam prescription is due with lorazepam.  I made him a follow-up appointment in 2 weeks.      Return in about 2 weeks (around 1/14/2020) for anxiety, depression.    Yeyo Watson MD  [facility]      "

## 2019-12-31 NOTE — PATIENT INSTRUCTIONS
We had a very carlos discussion about overuse of his clonazepam.  I have informed him that if the issue ever occurs again he will have to find care someplace else.  I did cover him until his next clonazepam prescription is due with lorazepam.  I made him a follow-up appointment in 2 weeks.

## 2020-01-01 ASSESSMENT — ANXIETY QUESTIONNAIRES: GAD7 TOTAL SCORE: 7

## 2020-01-14 PROBLEM — F13.20 BENZODIAZEPINE DEPENDENCE (H): Status: ACTIVE | Noted: 2019-09-26

## 2020-01-15 ENCOUNTER — OFFICE VISIT (OUTPATIENT)
Dept: FAMILY MEDICINE | Facility: CLINIC | Age: 35
End: 2020-01-15
Payer: COMMERCIAL

## 2020-01-15 VITALS
HEIGHT: 73 IN | HEART RATE: 74 BPM | DIASTOLIC BLOOD PRESSURE: 78 MMHG | RESPIRATION RATE: 14 BRPM | TEMPERATURE: 98.4 F | BODY MASS INDEX: 19 KG/M2 | OXYGEN SATURATION: 100 % | SYSTOLIC BLOOD PRESSURE: 136 MMHG

## 2020-01-15 DIAGNOSIS — R03.0 ELEVATED BLOOD PRESSURE READING WITHOUT DIAGNOSIS OF HYPERTENSION: Primary | ICD-10-CM

## 2020-01-15 DIAGNOSIS — F13.20 BENZODIAZEPINE DEPENDENCE (H): ICD-10-CM

## 2020-01-15 DIAGNOSIS — F33.42 RECURRENT MAJOR DEPRESSION IN COMPLETE REMISSION (H): ICD-10-CM

## 2020-01-15 DIAGNOSIS — F41.1 GENERALIZED ANXIETY DISORDER: ICD-10-CM

## 2020-01-15 PROCEDURE — 99213 OFFICE O/P EST LOW 20 MIN: CPT | Performed by: FAMILY MEDICINE

## 2020-01-15 NOTE — PROGRESS NOTES
Subjective     Ramon Jamison is a 34 year old male who presents to clinic today for the following health issues:    HPI   Hypertension Follow-up      Do you check your blood pressure regularly outside of the clinic? No     Are you following a low salt diet? No    Are your blood pressures ever more than 140 on the top number (systolic) OR more   than 90 on the bottom number (diastolic), for example 140/90? No      How many servings of fruits and vegetables do you eat daily?  0-1    On average, how many sweetened beverages do you drink each day (Examples: soda, juice, sweet tea, etc.  Do NOT count diet or artificially sweetened beverages)?   2    How many days per week do you exercise enough to make your heart beat faster? None    How many minutes a day do you exercise enough to make your heart beat faster? none    How many days per week do you miss taking your medication? 0        Patient Active Problem List   Diagnosis     Generalized anxiety disorder     Social phobia     Tobacco use disorder     Attention deficit hyperactivity disorder (ADHD)     Migraine without status migrainosus, not intractable     Elevated blood pressure reading without diagnosis of hypertension     Recurrent major depression in complete remission (H)     Benzodiazepine dependence (H)     Past Surgical History:   Procedure Laterality Date     NO HISTORY OF SURGERY         Social History     Tobacco Use     Smoking status: Current Every Day Smoker     Packs/day: 0.50     Years: 7.00     Pack years: 3.50     Types: Cigarettes     Smokeless tobacco: Never Used   Substance Use Topics     Alcohol use: Yes     Alcohol/week: 0.0 standard drinks     Comment: once monthly     Family History   Problem Relation Age of Onset     Unknown/Adopted Mother      Unknown/Adopted Father              Reviewed and updated as needed this visit by Provider  Tobacco         Review of Systems   ROS COMP: Constitutional, HEENT, cardiovascular, pulmonary, gi and gu  "systems are negative, except as otherwise noted.      Objective    /78   Pulse 74   Temp 98.4  F (36.9  C) (Tympanic)   Resp 14   Ht 1.854 m (6' 1\")   SpO2 100%   BMI 19.00 kg/m    Body mass index is 19 kg/m .  Physical Exam   GENERAL APPEARANCE: healthy, alert and no distress            Assessment & Plan       ICD-10-CM    1. Elevated blood pressure reading without diagnosis of hypertension R03.0    2. Benzodiazepine dependence (H) F13.20    3. Recurrent major depression in complete remission (H) F33.42    4. Generalized anxiety disorder F41.1           Patient Instructions   Clonazepam was refilled earlier this morning.  Follow-up will be in 3 months for a blood pressure check.      Return in about 3 months (around 4/15/2020) for elevated blood pressure without diagnosis of HTN.    Yeyo Watson MD  [facility]      "

## 2020-01-15 NOTE — PATIENT INSTRUCTIONS
Clonazepam was refilled earlier this morning.  Follow-up will be in 3 months for a blood pressure check.

## 2020-01-15 NOTE — LETTER
My Depression Action Plan  Name: Ramon Jamison   Date of Birth 1985  Date: 1/15/2020    My doctor: Yeyo Watson   My clinic: [address] [address]  [phone]          GREEN    ZONE   Good Control    What it looks like:     Things are going generally well. You have normal ups and downs. You may even feel depressed from time to time, but bad moods usually last less than a day.   What you need to do:  1. Continue to care for yourself (see self care plan)  2. Check your depression survival kit and update it as needed  3. Follow your physician s recommendations including any medication.  4. Do not stop taking medication unless you consult with your physician first.           YELLOW         ZONE Getting Worse    What it looks like:     Depression is starting to interfere with your life.     It may be hard to get out of bed; you may be starting to isolate yourself from others.    Symptoms of depression are starting to last most all day and this has happened for several days.     You may have suicidal thoughts but they are not constant.   What you need to do:     1. Call your care team. Your response to treatment will improve if you keep your care team informed of your progress. Yellow periods are signs an adjustment may need to be made.     2. Continue your self-care.  Just get dressed and ready for the day.  Don't give yourself time to talk yourself out of it.    3. Talk to someone in your support network.    4. Open up your Depression Self-Care Plan/Wellness Kit.           RED    ZONE Medical Alert - Get Help    What it looks like:     Depression is seriously interfering with your life.     You may experience these or other symptoms: You can t get out of bed most days, can t work or engage in other necessary activities, you have trouble taking care of basic hygiene, or basic responsibilities, thoughts of  suicide or death that will not go away, self-injurious behavior.     What you need to do:  1. Call your care team and request a same-day appointment. If they are not available (weekends or after hours) call your local crisis line, emergency room or 911.            Depression Self-Care Plan / Wellness Kit    Self-Care for Depression  Here s the deal. Your body and mind are really not as separate as most people think.  What you do and think affects how you feel and how you feel influences what you do and think. This means if you do things that people who feel good do, it will help you feel better.  Sometimes this is all it takes.  There is also a place for medication and therapy depending on how severe your depression is, so be sure to consult with your medical provider and/ or Behavioral Health Consultant if your symptoms are worsening or not improving.     In order to better manage my stress, I will:    Exercise  Get some form of exercise, every day. This will help reduce pain and release endorphins, the  feel good  chemicals in your brain. This is almost as good as taking antidepressants!  This is not the same as joining a gym and then never going! (they count on that by the way ) It can be as simple as just going for a walk or doing some gardening, anything that will get you moving.      Hygiene   Maintain good hygiene (get out of bed in the morning, make your bed, brush your teeth, take a shower, and get dressed like you were going to work, even if you are unemployed).  If your clothes don't fit try to get ones that do.    Diet  Strive to eat foods that are good for me, drink plenty of water, and avoid excessive sugar, caffeine, alcohol, and other mood-altering substances.  Some foods that are helpful in depression are: complex carbohydrates, B vitamins, flaxseed, fish or fish oil, fresh fruits and vegetables.    Psychotherapy  Agree to participate in Individual Therapy (if recommended).    Medication  If  prescribed medications, I agree to take them.  Missing doses can result in serious side effects.  I understand that drinking alcohol, or other illicit drug use, may cause potential side effects.  I will not stop my medication abruptly without first discussing it with my provider.    Staying Connected With Others  Stay in touch with my friends, family members, and my primary care provider/team.    Use your imagination  Be creative.  We all have a creative side; it doesn t matter if it s oil painting, sand castles, or mud pies! This will also kick up the endorphins.    Witness Beauty  (AKA stop and smell the roses) Take a look outside, even in mid-winter. Notice colors, textures. Watch the squirrels and birds.     Service to others  Be of service to others.  There is always someone else in need.  By helping others we can  get out of ourselves  and remember the really important things.  This also provides opportunities for practicing all the other parts of the program.    Humor  Laugh and be silly!  Adjust your TV habits for less news and crime-drama and more comedy.    Control your stress  Try breathing deep, massage therapy, biofeedback, and meditation. Find time to relax each day.     Crisis Text Line  http://www.crisistextline.org    The Crisis Text Line serves anyone, in any type of crisis, providing access to free, 24/7 support and information via the medium people already use and trust:    Here's how it works:  1.  Text 212-328 from anywhere in the [place], anytime, about any type of crisis.  2.  A live, trained Crisis Counselor receives the text and responds quickly.  3.  The volunteer Crisis Counselor will help you move from a 'hot moment to a cool moment'.    My support system    Clinic Contact:  Phone number:    Contact 1:  Phone number:    Contact 2:  Phone number:    Restoration[employer]/:  Phone number:    Therapist:  Phone number:    Local crisis center:    Phone number:    Other community support:   Phone number:

## 2020-01-15 NOTE — LETTER
My Depression Action Plan  Name: Ramon Jamison   Date of Birth 1985  Date: 1/15/2020    My doctor: Yeyo Watson   My clinic: [address] [address]  [phone]          GREEN    ZONE   Good Control    What it looks like:     Things are going generally well. You have normal ups and downs. You may even feel depressed from time to time, but bad moods usually last less than a day.   What you need to do:  1. Continue to care for yourself (see self care plan)  2. Check your depression survival kit and update it as needed  3. Follow your physician s recommendations including any medication.  4. Do not stop taking medication unless you consult with your physician first.           YELLOW         ZONE Getting Worse    What it looks like:     Depression is starting to interfere with your life.     It may be hard to get out of bed; you may be starting to isolate yourself from others.    Symptoms of depression are starting to last most all day and this has happened for several days.     You may have suicidal thoughts but they are not constant.   What you need to do:     1. Call your care team. Your response to treatment will improve if you keep your care team informed of your progress. Yellow periods are signs an adjustment may need to be made.     2. Continue your self-care.  Just get dressed and ready for the day.  Don't give yourself time to talk yourself out of it.    3. Talk to someone in your support network.    4. Open up your Depression Self-Care Plan/Wellness Kit.           RED    ZONE Medical Alert - Get Help    What it looks like:     Depression is seriously interfering with your life.     You may experience these or other symptoms: You can t get out of bed most days, can t work or engage in other necessary activities, you have trouble taking care of basic hygiene, or basic responsibilities, thoughts of  suicide or death that will not go away, self-injurious behavior.     What you need to do:  1. Call your care team and request a same-day appointment. If they are not available (weekends or after hours) call your local crisis line, emergency room or 911.            Depression Self-Care Plan / Wellness Kit    Self-Care for Depression  Here s the deal. Your body and mind are really not as separate as most people think.  What you do and think affects how you feel and how you feel influences what you do and think. This means if you do things that people who feel good do, it will help you feel better.  Sometimes this is all it takes.  There is also a place for medication and therapy depending on how severe your depression is, so be sure to consult with your medical provider and/ or Behavioral Health Consultant if your symptoms are worsening or not improving.     In order to better manage my stress, I will:    Exercise  Get some form of exercise, every day. This will help reduce pain and release endorphins, the  feel good  chemicals in your brain. This is almost as good as taking antidepressants!  This is not the same as joining a gym and then never going! (they count on that by the way ) It can be as simple as just going for a walk or doing some gardening, anything that will get you moving.      Hygiene   Maintain good hygiene (get out of bed in the morning, make your bed, brush your teeth, take a shower, and get dressed like you were going to work, even if you are unemployed).  If your clothes don't fit try to get ones that do.    Diet  Strive to eat foods that are good for me, drink plenty of water, and avoid excessive sugar, caffeine, alcohol, and other mood-altering substances.  Some foods that are helpful in depression are: complex carbohydrates, B vitamins, flaxseed, fish or fish oil, fresh fruits and vegetables.    Psychotherapy  Agree to participate in Individual Therapy (if recommended).    Medication  If  prescribed medications, I agree to take them.  Missing doses can result in serious side effects.  I understand that drinking alcohol, or other illicit drug use, may cause potential side effects.  I will not stop my medication abruptly without first discussing it with my provider.    Staying Connected With Others  Stay in touch with my friends, family members, and my primary care provider/team.    Use your imagination  Be creative.  We all have a creative side; it doesn t matter if it s oil painting, sand castles, or mud pies! This will also kick up the endorphins.    Witness Beauty  (AKA stop and smell the roses) Take a look outside, even in mid-winter. Notice colors, textures. Watch the squirrels and birds.     Service to others  Be of service to others.  There is always someone else in need.  By helping others we can  get out of ourselves  and remember the really important things.  This also provides opportunities for practicing all the other parts of the program.    Humor  Laugh and be silly!  Adjust your TV habits for less news and crime-drama and more comedy.    Control your stress  Try breathing deep, massage therapy, biofeedback, and meditation. Find time to relax each day.     Crisis Text Line  http://www.crisistextline.org    The Crisis Text Line serves anyone, in any type of crisis, providing access to free, 24/7 support and information via the medium people already use and trust:    Here's how it works:  1.  Text 898-498 from anywhere in the [place], anytime, about any type of crisis.  2.  A live, trained Crisis Counselor receives the text and responds quickly.  3.  The volunteer Crisis Counselor will help you move from a 'hot moment to a cool moment'.    My support system    Clinic Contact:  Phone number:    Contact 1:  Phone number:    Contact 2:  Phone number:    Gn[employer]ism/:  Phone number:    Therapist:  Phone number:    Local crisis center:    Phone number:    Other community support:   Phone number:

## 2020-02-13 ENCOUNTER — TELEPHONE (OUTPATIENT)
Dept: FAMILY MEDICINE | Facility: CLINIC | Age: 35
End: 2020-02-13

## 2020-02-13 NOTE — TELEPHONE ENCOUNTER
"The pharmacy sent a note stating \"LAST SOLD 1/19 HOW EARLY CAN HE PICK THIS UP?  LET US KNOW YOU WROTE SUPPLY MUST LAST 30 DAYS ON ORDER.     The Pharmacy would like clarification on this.  Sho: [phone]  "

## 2020-02-13 NOTE — TELEPHONE ENCOUNTER
Routing to provider- Please see prior message from pt.    -Start date written for 02/14.  -Ok to  on start date or delay?    Please let Nurse Triage know if we should do anything for follow up. Thank you!

## 2020-02-17 NOTE — TELEPHONE ENCOUNTER
Pharmacy states he has picked up the medication appropriately.  No further action needed at this time.

## 2020-02-17 NOTE — TELEPHONE ENCOUNTER
Patient Contact  ([facility] pharmacy)    Attempt # 1    Was call answered?  No.  Left message on voicemail with information to call the clinic back to speak with triage.     On call back: Did patient  medication at this time? If not, verbal okay can be given per MD for patient to .

## 2020-03-09 DIAGNOSIS — F41.1 GENERALIZED ANXIETY DISORDER: ICD-10-CM

## 2020-03-09 RX ORDER — CLONAZEPAM 2 MG/1
TABLET ORAL
Qty: 60 TABLET | Refills: 0 | OUTPATIENT
Start: 2020-03-09

## 2020-03-09 RX ORDER — CLONAZEPAM 2 MG/1
TABLET ORAL
Qty: 60 TABLET | Refills: 0 | Status: SHIPPED | OUTPATIENT
Start: 2020-03-09 | End: 2020-04-07

## 2020-03-09 NOTE — TELEPHONE ENCOUNTER
Routing refill request to provider for review/approval because:  Drug not on the [facility] refill protocol     Requesting refill 6 days early

## 2020-03-09 NOTE — TELEPHONE ENCOUNTER
Reason for Call:  Medication or medication refill:    Do you use a [facility]?  Name of the pharmacy and phone number for the current request:    [id number] - [place], [place] - [address] AT [facility] [address]    Name of the medication requested: clonazePAM (KLONOPIN) 2 MG tablet       Other request: The patient called and stated that he needs this medication filled. He stated that he will need this medication filled this week.     Can we leave a detailed message on this number? YES    Phone number patient can be reached at: Cell number on file:    Telephone Information:   Mobile [phone]       Best Time: Any    Call taken on 3/9/2020 at 10:00 AM by Maryana Vanegas

## 2020-03-09 NOTE — TELEPHONE ENCOUNTER
The current refill says it cannot be filled at the [facility] on [address] because it originated from the [facility] in [place].  Please contact the patient to find out where he wants it refilled and then correct the pharmacy request.    Routing comment        Please see providers message above.   Left voice message asking pt to call triage back. Triage, please verify what pharmacy pt plans to use and send message to provider once correct pharmacy is pended.

## 2020-03-09 NOTE — TELEPHONE ENCOUNTER
Call back from patient.     [facility] to be [facility] to. This was updated for PCP and sent for review.

## 2020-03-09 NOTE — TELEPHONE ENCOUNTER
Requested Prescriptions   Pending Prescriptions Disp Refills     clonazePAM (KLONOPIN) 2 MG tablet [Pharmacy Med Name: CLONAZEPAM 2MG TABLETS] 60 tablet      Sig: TAKE 1 TABLET(2 MG) BY MOUTH TWICE DAILY.     Controlled Substance Refill Request for clonazePAM (KLONOPIN) 2 MG tablet     Problem List Complete:  Yes    Patient is followed by [facility] for ongoing prescription of benzodiazepines.  All refills should be approved by this provider, or covering partner.     Medication(s): clonazepam 1 mg BID, Gabapentin 600 mg  Maximum quantity per month: 60  Clinic visit frequency required: Q 3 months      Controlled substance agreement on file: No  Benzodiazepine use reviewed by psychiatry:  No     [place] reviewed:  reviewed- recommended for provider review 01/14/20     [place]  [email or web address]         Previous failed medications: celexa (nausea), effexor, zoloft, paxil     checked in past 3 months?  Yes 1/14/2020            There is no refill protocol information for this order

## 2020-04-07 DIAGNOSIS — F41.1 GENERALIZED ANXIETY DISORDER: ICD-10-CM

## 2020-04-07 NOTE — TELEPHONE ENCOUNTER
Routing refill request to provider for review/approval because:  Drug not on the [facility] refill protocol   Nathalie Oliveira PharmD, JR, BCACP  [facility] Pharmacist, [facility]

## 2020-04-08 RX ORDER — CLONAZEPAM 2 MG/1
TABLET ORAL
Qty: 60 TABLET | Refills: 0 | Status: SHIPPED | OUTPATIENT
Start: 2020-04-08 | End: 2020-05-08

## 2020-04-08 NOTE — TELEPHONE ENCOUNTER
Controlled Substance Refill Request for clonazePAM (KLONOPIN) 2 MG tablet     Problem List Complete:  Yes    Generalized anxiety disorder   Unknown    Overview    Patient is followed by HEIDI PENN for ongoing prescription of benzodiazepines.  All refills should be approved by this provider, or covering partner.     Medication(s): clonazepam 1 mg BID, Gabapentin 600 mg  Maximum quantity per month: 60  Clinic visit frequency required: Q 3 months      Controlled substance agreement on file: No  Benzodiazepine use reviewed by psychiatry:  No     RX monitoring program ([place]MP) reviewed:  reviewed- recommended for provider review 01/14/20     [place]MP [place]:  [email or web address]         Previous failed medications: celexa (nausea), effexor, zoloft, paxil        checked in past 3 months?  Yes 01/14/20

## 2020-04-15 ENCOUNTER — VIRTUAL VISIT (OUTPATIENT)
Dept: FAMILY MEDICINE | Facility: CLINIC | Age: 35
End: 2020-04-15
Payer: MEDICARE

## 2020-04-15 DIAGNOSIS — R03.0 ELEVATED BLOOD PRESSURE READING WITHOUT DIAGNOSIS OF HYPERTENSION: Primary | ICD-10-CM

## 2020-04-15 PROCEDURE — 99213 OFFICE O/P EST LOW 20 MIN: CPT | Mod: 95 | Performed by: FAMILY MEDICINE

## 2020-04-15 NOTE — PROGRESS NOTES
"Ramon Jamison is a 35 year old male who is being evaluated via a billable telephone visit.      The patient has been notified of following:     \"This telephone visit will be conducted via a call between you and your physician/provider. We have found that certain health care needs can be provided without the need for a physical exam.  This service lets us provide the care you need with a short phone conversation.  If a prescription is necessary we can send it directly to your pharmacy.  If lab work is needed we can place an order for that and you can then stop by our lab to have the test done at a later time.    Telephone visits are billed at different rates depending on your insurance coverage. During this emergency period, for some insurers they may be billed the same as an in-person visit.  Please reach out to your insurance provider with any questions.    If during the course of the call the physician/provider feels a telephone visit is not appropriate, you will not be charged for this service.\"    Patient has given verbal consent for Telephone visit?  Yes    How would you like to obtain your AVS? Lan Culp     Ramon Jamison is a 35 year old male who presents to clinic today for the following health issues:    Hypertension Follow-up      Do you check your blood pressure regularly outside of the clinic? No but pt reports no symptoms of high bp so he feels they are within range - usually gets headaches, etc if high     Are you following a low salt diet? No    Are your blood pressures ever more than 140 on the top number (systolic) OR more   than 90 on the bottom number (diastolic), for example 140/90? Not sure       How many servings of fruits and vegetables do you eat daily?  2-3    On average, how many sweetened beverages do you drink each day (Examples: soda, juice, sweet tea, etc.  Do NOT count diet or artificially sweetened beverages)?   5    How many days per week do you exercise enough to make " your heart beat faster? 7    How many minutes a day do you exercise enough to make your heart beat faster? 30 - 60    How many days per week do you miss taking your medication? 0    Blood pressures typically are 130's/80's             Patient Active Problem List   Diagnosis     Generalized anxiety disorder     Social phobia     Tobacco use disorder     Attention deficit hyperactivity disorder (ADHD)     Migraine without status migrainosus, not intractable     Elevated blood pressure reading without diagnosis of hypertension     Recurrent major depression in complete remission (H)     Benzodiazepine dependence (H)     Past Surgical History:   Procedure Laterality Date     NO HISTORY OF SURGERY         Social History     Tobacco Use     Smoking status: Current Every Day Smoker     Packs/day: 0.50     Years: 7.00     Pack years: 3.50     Types: Cigarettes     Smokeless tobacco: Never Used   Substance Use Topics     Alcohol use: Yes     Alcohol/week: 0.0 standard drinks     Comment: once monthly     Family History   Problem Relation Age of Onset     Unknown/Adopted Mother      Unknown/Adopted Father            Reviewed and updated as needed this visit by Provider         Review of Systems   ROS COMP: Constitutional, HEENT, cardiovascular, pulmonary, gi and gu systems are negative, except as otherwise noted.       Objective   Reported vitals:  There were no vitals taken for this visit.   alert  PSYCH: Alert and oriented times 3; coherent speech, normal   rate and volume, able to articulate logical thoughts, able   to abstract reason, no tangential thoughts, no hallucinations   or delusions  His affect is normal and pleasant  RESP: No cough, no audible wheezing, able to talk in full sentences  Remainder of exam unable to be completed due to telephone visits            Assessment/Plan:  1. Elevated blood pressure reading without diagnosis of hypertension  Patient has a blood pressure monitor at home.  He will start taking  his blood pressure once a week get back to us in about a month on a telephone visit to review his blood pressures.      No follow-ups on file.      Phone call duration:  6 minutes    Yeyo Watson MD

## 2020-05-08 DIAGNOSIS — F41.1 GENERALIZED ANXIETY DISORDER: ICD-10-CM

## 2020-05-08 RX ORDER — CLONAZEPAM 2 MG/1
TABLET ORAL
Qty: 60 TABLET | Refills: 0 | Status: SHIPPED | OUTPATIENT
Start: 2020-05-08 | End: 2020-05-11

## 2020-05-08 NOTE — TELEPHONE ENCOUNTER
RX monitoring program ([place]MP) reviewed:  reviewed- recommend provider review - one outside prescriber from 9/15/19    MNPMP profile:  [email or web address]  Routing refill request to provider for review/approval because:  Drug not on the [facility] refill protocol     See patient note below:   The patient called and stated that he needs this medication filled. He wanted to make sure that Dr. Watson is aware that he is not trying to get this filled early and he is switching pharmacies as the brand of medication that is carried at [facility] seems to work better for him. He stated that he will need this filled by 5/12 as he is close to being out.

## 2020-05-08 NOTE — TELEPHONE ENCOUNTER
Controlled Substance Refill Request for clonazePAM (KLONOPIN) 2 MG tablet   Problem List Complete:  Yes      Patient is followed by [facility] PENN for ongoing prescription of benzodiazepines.  All refills should be approved by this provider, or covering partner.     Medication(s): clonazepam 1 mg BID, Gabapentin 600 mg  Maximum quantity per month: 60  Clinic visit frequency required: Q 3 months      Controlled substance agreement on file: No  Benzodiazepine use reviewed by psychiatry:  No     RX monitoring program ([place]MP) reviewed:  reviewed- recommended for provider review 01/14/20     MNPMP profile:  [email or web address]         Previous failed medications: celexa (nausea), effexor, zoloft, paxil      Last Written Prescription Date:  4/8/2020  Last Fill Quantity: 60 tablet,  # refills: 0   Last office visit: 4/15/2020 with prescribing provider:  Marko   Future Office Visit:        Controlled substance agreement:   Encounter-Level CSA:    There are no encounter-level csa.     Patient-Level CSA:    Controlled Substance Agreement - Non - Opioid - Scan on 5/16/2019 11:50 AM: NON-OPIOID CONTROLLED SUBSTANCE AGREEMENT         Last Urine Drug Screen: No results found for: CDAUT, No results found for: COMDAT, No results found for: THC13, PCP13, COC13, MAMP13, OPI13, AMP13, BZO13, TCA13, MTD13, BAR13, OXY13, PPX13, BUP13         [email or web address]   checked in past 3 months?  No, route to RN

## 2020-05-08 NOTE — TELEPHONE ENCOUNTER
Reason for Call:  Medication or medication refill:    Do you use a [facility]?  Name of the pharmacy and phone number for the current request:     [facility] [id number] [place] [facility], [place] - [address] [place] [address] [phone]    Name of the medication requested: clonazePAM (KLONOPIN) 2 MG tablet       Other request: The patient called and stated that he needs this medication filled. He wanted to make sure that Dr. Watson is aware that he is not trying to get this filled early and he is switching pharmacies as the brand of medication that is carried at [facility] seems to work better for him. He stated that he will need this filled by 5/12 as he is close to being out.     Can we leave a detailed message on this number? YES    Phone number patient can be reached at: Cell number on file:    Telephone Information:   Mobile [phone]       Best Time: Any    Call taken on 5/8/2020 at 1:56 PM by Maryana Vanegas

## 2020-05-11 DIAGNOSIS — F41.1 GENERALIZED ANXIETY DISORDER: ICD-10-CM

## 2020-05-11 RX ORDER — CLONAZEPAM 2 MG/1
TABLET ORAL
Qty: 60 TABLET | Refills: 0 | Status: SHIPPED | OUTPATIENT
Start: 2020-05-11 | End: 2020-06-09

## 2020-05-11 NOTE — TELEPHONE ENCOUNTER
Reason for Call:  Medication or medication refill:    Do you use a [facility]?  Name of the pharmacy and phone number for the current request:       [id number] - [place], [place] - [address]      Name of the medication requested: clonazePAM (KLONOPIN) 2 MG tablet     Other request: The patient called and stated that the pharmacy that this prescription was sent to did not care the specific generic brand of this medication so he searched around and found a new pharmacy that has it in stock.  He is hoping that we can send the prescription to the pharmacy listed above and cancel the current one on file.  He stated that he is almost out of this medication so he is hoping this can be done as soon as possible.  Can we leave a detailed message on this number? YES    Phone number patient can be reached at: Home number on file [phone] (home)    Best Time: Any    Call taken on 5/11/2020 at 12:18 PM by Maryana Yost

## 2020-05-11 NOTE — TELEPHONE ENCOUNTER
The patient called and inquired about the status of this refill request.  He stated that he gets antsy when he gets to the end of his medication.  informed him that it was sent to the provider as a high priority and that it should be taken care of today.

## 2020-06-08 DIAGNOSIS — F41.1 GENERALIZED ANXIETY DISORDER: ICD-10-CM

## 2020-06-08 NOTE — TELEPHONE ENCOUNTER
Reason for Call:  Medication or medication refill:    Do you use a [facility]?  Name of the pharmacy and phone number for the current request:  [facility]    Name of the medication requested: clonazePAM (KLONOPIN) 2 MG     Other request: states was told on 6/4 was too soon so now wants filled at [facility] says does not like the [facility] brand     Can we leave a detailed message on this number? YES    Phone number patient can be reached at: Home number on file [phone] (home)    Best Time:     Call taken on 6/8/2020 at 2:23 PM by KATHERIN LUNA

## 2020-06-09 RX ORDER — CLONAZEPAM 2 MG/1
TABLET ORAL
Qty: 60 TABLET | Refills: 0 | Status: SHIPPED | OUTPATIENT
Start: 2020-06-10 | End: 2020-07-06

## 2020-06-30 DIAGNOSIS — F41.1 GENERALIZED ANXIETY DISORDER: ICD-10-CM

## 2020-06-30 RX ORDER — GABAPENTIN 600 MG/1
TABLET ORAL
Qty: 150 TABLET | Refills: 3 | Status: SHIPPED | OUTPATIENT
Start: 2020-06-30 | End: 2020-10-16

## 2020-06-30 NOTE — TELEPHONE ENCOUNTER
Controlled Substance Refill Request for gabapentin (NEURONTIN) 600 MG tablet   Problem List Complete:  Yes    Patient is followed by [facility]IDI PENN for ongoing prescription of benzodiazepines.  All refills should be approved by this provider, or covering partner.    Medication(s): clonazepam 1 mg BID, Gabapentin 600 mg  Maximum quantity per month: 60  Clinic visit frequency required: Q 3 months     Controlled substance agreement on file: No  Benzodiazepine use reviewed by psychiatry:  No    RX monitoring program ([place]NPMP) reviewed:  reviewed- recommended for provider review 06/30/20    MNPMP profile:  [email or web address]     Previous failed medications: celexa (nausea), effexor, zoloft, paxil     checked in past 3 months?  Yes 6/30/20 - no concerns

## 2020-07-05 DIAGNOSIS — F41.1 GENERALIZED ANXIETY DISORDER: ICD-10-CM

## 2020-07-06 RX ORDER — CLONAZEPAM 2 MG/1
TABLET ORAL
Qty: 60 TABLET | Refills: 0 | Status: SHIPPED | OUTPATIENT
Start: 2020-07-06 | End: 2020-07-07

## 2020-07-06 NOTE — TELEPHONE ENCOUNTER
checked in past 3 months?  Yes 7/6/2020, some early requests - otherwise no concerns     Controlled Substance Refill Request for   Requested Prescriptions   Pending Prescriptions Disp Refills     clonazePAM (KLONOPIN) 2 MG tablet [Pharmacy Med Name: CLONAZEPAM 2 MG TABLET]  Last Written Prescription Date:  6/10/2020  Last Fill Quantity: 60,  # refills: 0   Last office visit: 4/15/2020 (VIRTUAL) with prescribing provider:  Shirley   Future Office Visit:     60 tablet 0     Sig: TAKE 1 TABLET BY MOUTH TWICE A DAY       There is no refill protocol information for this order          Problem List Complete:  Yes  Patient is followed by HEIDI PENN for ongoing prescription of benzodiazepines.  All refills should be approved by this provider, or covering partner.     Medication(s): clonazepam 1 mg BID, Gabapentin 600 mg  Maximum quantity per month: 60  Clinic visit frequency required: Q 3 months      Controlled substance agreement on file: No  Benzodiazepine use reviewed by psychiatry:  No     RX monitoring program ([place]NPMP) reviewed:  reviewed- recommended for provider review 06/30/20 - no concerns      MNPMP profile:  [email or web address]         Previous failed medications: celexa (nausea), effexor, zoloft, paxil   checked in past 3 months?  Yes 7/6/2020, some early requests - otherwise no concerns   RX monitoring program ([place]NPMP) reviewed:  reviewed- no concerns    MNPMP profile:  [email or web address]  Routing refill request to provider for review/approval because:  Drug not on the [facility] refill protocol

## 2020-07-07 ENCOUNTER — TELEPHONE (OUTPATIENT)
Dept: FAMILY MEDICINE | Facility: CLINIC | Age: 35
End: 2020-07-07

## 2020-07-07 DIAGNOSIS — F41.1 GENERALIZED ANXIETY DISORDER: ICD-10-CM

## 2020-07-07 RX ORDER — CLONAZEPAM 2 MG/1
TABLET ORAL
Qty: 60 TABLET | Refills: 0 | Status: SHIPPED | OUTPATIENT
Start: 2020-07-07 | End: 2020-07-30

## 2020-07-07 NOTE — TELEPHONE ENCOUNTER
Pharmacy Change    Reason for Call:  Medication or medication refill:    Do you use a [facility]?  Name of the pharmacy and phone number for the current request:       [id number]  [place], [place] - [address] [facility]      Name of the medication requested: clonazePAM (KLONOPIN) 2 MG tablet    Other request: The patient called and stated that his insurance will not cover him filling this medication at [facility].  He is requesting this be sent to the [facility] listed above.     Can we leave a detailed message on this number? YES    Phone number patient can be reached at: Home number on file [phone] (home)    Best Time: Any    Call taken on 7/7/2020 at 4:23 PM by Maryana Yost

## 2020-07-07 NOTE — TELEPHONE ENCOUNTER
Patient was called, this Rx was already sent to [facility] in [place] yesterday. Pt said his insurance does not cover [facility] and [facility] has the name brand he wants. Clonazepam 2 mg Rx dated 7/6/20 cancelled at [facility] in [place]. Requesting Rx be resent to [facility] in [place].      checked in past 3 months?  Yes 7/6/2020, some early requests - otherwise no concerns     Controlled Substance Refill Request for   Requested Prescriptions   Pending Prescriptions Disp Refills     clonazePAM (KLONOPIN) 2 MG tablet [Pharmacy Med Name: CLONAZEPAM 2 MG TABLET]  Last Written Prescription Date:  6/10/2020  Last Fill Quantity: 60,  # refills: 0   Last office visit: 4/15/2020 (VIRTUAL) with prescribing provider:  Shirley   Future Office Visit:     60 tablet 0     Sig: TAKE 1 TABLET BY MOUTH TWICE A DAY       There is no refill protocol information for this order        Problem List Complete:  Yes  Patient is followed by HEIDI PENN for ongoing prescription of benzodiazepines.  All refills should be approved by this provider, or covering partner.     Medication(s): clonazepam 1 mg BID, Gabapentin 600 mg  Maximum quantity per month: 60  Clinic visit frequency required: Q 3 months      Controlled substance agreement on file: No  Benzodiazepine use reviewed by psychiatry:  No     RX monitoring program ([place]NPMP) reviewed:  reviewed- recommended for provider review 06/30/20 - no concerns      MNPMP profile:  [email or web address]      Previous failed medications: celexa (nausea), effexor, zoloft, paxil   checked in past 3 months?  Yes 7/6/2020, some early requests - otherwise no concerns   RX monitoring program ([place]NPMP) reviewed:  reviewed- no concerns    MNPMP profile:  [email or web address]  Routing refill request to provider for review/approval because:  Drug not on the [facility] refill protocol

## 2020-07-07 NOTE — TELEPHONE ENCOUNTER
[facility]  [phone]    [facility] states that current new script for clonazePAM (KLONOPIN) 2 MG tablet is sent to [facility] but pt insurance does not cover this script from [facility]. Pharmacist would just like providers to be aware that this may need to be sent to a different pharmacy.    Plans to update clinic by end of day.    Additionally mentions that pt has been rude to pharmacy, so pharmacist may refuse service to pt. In this case, script will need to be sent elsewhere as well

## 2020-07-29 DIAGNOSIS — F41.1 GENERALIZED ANXIETY DISORDER: ICD-10-CM

## 2020-07-30 RX ORDER — CLONAZEPAM 2 MG/1
TABLET ORAL
Qty: 60 TABLET | Refills: 0 | Status: SHIPPED | OUTPATIENT
Start: 2020-08-06 | End: 2020-08-31

## 2020-08-28 DIAGNOSIS — F41.1 GENERALIZED ANXIETY DISORDER: ICD-10-CM

## 2020-08-31 RX ORDER — CLONAZEPAM 2 MG/1
TABLET ORAL
Qty: 60 TABLET | Refills: 0 | Status: SHIPPED | OUTPATIENT
Start: 2020-08-31 | End: 2020-09-25

## 2020-08-31 NOTE — TELEPHONE ENCOUNTER
Controlled Substance Refill Request for   Requested Prescriptions   Pending Prescriptions Disp Refills     clonazePAM (KLONOPIN) 2 MG tablet [Pharmacy Med Name: clonazePAM 2 MG Oral Tablet]  Last Written Prescription Date:  8/6/2020  Last Fill Quantity: 60,  # refills: 0   Last office visit: 1/15/2020 with prescribing provider:  Shirley   Future Office Visit:     60 tablet 0     Sig: Take 1 tablet by mouth twice daily       There is no refill protocol information for this order          Problem List Complete:  Yes  Patient is followed by [facility]ENN for ongoing prescription of benzodiazepines.  All refills should be approved by this provider, or covering partner.     Medication(s): clonazepam 1 mg BID, Gabapentin 600 mg  Maximum quantity per month: 60  Clinic visit frequency required: Q 3 months      Controlled substance agreement on file: No  Benzodiazepine use reviewed by psychiatry:  No      checked in past 3 months?  Yes 7/6/2020, some early requests - otherwise no concerns      MNPMP profile:  [email or web address]         Previous failed medications: celexa (nausea), effexor, zoloft, paxil   checked in past 3 months?  Yes 7/6/2020, some early requests - otherwise no concerns   RX monitoring program ([place]NPMP) reviewed:  not reviewed/not due - last done on 7/6/2020    MNPMP profile:  [email or web address]  Routing refill request to provider for review/approval because:  Drug not on the [facility] refill protocol

## 2020-11-17 DIAGNOSIS — F41.1 GENERALIZED ANXIETY DISORDER: ICD-10-CM

## 2020-11-20 RX ORDER — CLONAZEPAM 2 MG/1
TABLET ORAL
Qty: 60 TABLET | Refills: 0 | OUTPATIENT
Start: 2020-11-20

## 2020-12-15 DIAGNOSIS — F41.1 GENERALIZED ANXIETY DISORDER: ICD-10-CM

## 2020-12-15 RX ORDER — CLONAZEPAM 2 MG/1
2 TABLET ORAL 2 TIMES DAILY
Qty: 60 TABLET | Refills: 0 | Status: SHIPPED | OUTPATIENT
Start: 2020-12-19 | End: 2021-01-13

## 2020-12-15 NOTE — TELEPHONE ENCOUNTER
clonazepam     Last Written Prescription Date:  11-21-20  Last Fill Quantity: 60,   # refills: 0  Last Office Visit: 4-15-20  Future Office visit:       Routing refill request to provider for review/approval because:  Drug not on the [facility] refill protocol or controlled substance

## 2021-01-11 ENCOUNTER — TELEPHONE (OUTPATIENT)
Dept: INTERNAL MEDICINE | Facility: CLINIC | Age: 36
End: 2021-01-11

## 2021-01-11 DIAGNOSIS — F41.1 GENERALIZED ANXIETY DISORDER: ICD-10-CM

## 2021-01-13 RX ORDER — CLONAZEPAM 2 MG/1
TABLET ORAL
Qty: 60 TABLET | Refills: 0 | Status: SHIPPED | OUTPATIENT
Start: 2021-01-14 | End: 2021-02-09

## 2021-01-14 ENCOUNTER — HEALTH MAINTENANCE LETTER (OUTPATIENT)
Age: 36
End: 2021-01-14

## 2021-01-20 DIAGNOSIS — F41.1 GENERALIZED ANXIETY DISORDER: ICD-10-CM

## 2021-01-20 RX ORDER — GABAPENTIN 600 MG/1
TABLET ORAL
Qty: 150 TABLET | Refills: 3 | Status: SHIPPED | OUTPATIENT
Start: 2021-01-20 | End: 2021-05-05

## 2021-02-08 DIAGNOSIS — F41.1 GENERALIZED ANXIETY DISORDER: ICD-10-CM

## 2021-02-09 RX ORDER — CLONAZEPAM 2 MG/1
TABLET ORAL
Qty: 60 TABLET | Refills: 0 | Status: SHIPPED | OUTPATIENT
Start: 2021-02-12 | End: 2021-03-08

## 2021-03-04 DIAGNOSIS — F41.1 GENERALIZED ANXIETY DISORDER: ICD-10-CM

## 2021-03-05 ENCOUNTER — NURSE TRIAGE (OUTPATIENT)
Dept: NURSING | Facility: CLINIC | Age: 36
End: 2021-03-05

## 2021-03-05 DIAGNOSIS — F41.1 GENERALIZED ANXIETY DISORDER: ICD-10-CM

## 2021-03-05 RX ORDER — CLONAZEPAM 2 MG/1
TABLET ORAL
Qty: 60 TABLET | Refills: 0 | OUTPATIENT
Start: 2021-03-05

## 2021-03-05 RX ORDER — CLONAZEPAM 2 MG/1
2 TABLET ORAL 2 TIMES DAILY
Qty: 60 TABLET | Refills: 0 | Status: CANCELLED | OUTPATIENT
Start: 2021-03-05

## 2021-03-05 NOTE — TELEPHONE ENCOUNTER
"Clinic Action Needed: Yes. Please call patient.     Reason for Call: Patient calling requesting refill for clonazePAM (KLONOPIN) 2 MG tablet. Patient states he was told by pharmacy that medication refill was refused by clinic and that he is \"confused\" about that.     Routed to: [facility] Nurse Pool.    Ian Gloria RN  [facility]         "

## 2021-03-06 DIAGNOSIS — F41.1 GENERALIZED ANXIETY DISORDER: ICD-10-CM

## 2021-03-08 ENCOUNTER — MYC REFILL (OUTPATIENT)
Dept: INTERNAL MEDICINE | Facility: CLINIC | Age: 36
End: 2021-03-08

## 2021-03-08 DIAGNOSIS — F41.1 GENERALIZED ANXIETY DISORDER: ICD-10-CM

## 2021-03-08 NOTE — TELEPHONE ENCOUNTER
Rx refused on 3/5/21 by Dr. Watson.     Request refused: Patient has requested refill too soon   Sig: TAKE 1 TABLET BY MOUTH TWICE DAILY . APPOINTMENT REQUIRED FOR FUTURE REFILLS

## 2021-03-08 NOTE — TELEPHONE ENCOUNTER
Dr. Watson,     Pt is calling about his RX for clonazepam. He is not out of medication yet. But is calling in for refill.   RX was last filled & picked up on 2/9, (checked ).  Virtual phone visit scheduled with Dr. Watson on Thursday.    Are you able to refill RX for pt?  RX was last filled on 2/9.

## 2021-03-09 RX ORDER — CLONAZEPAM 2 MG/1
2 TABLET ORAL 2 TIMES DAILY PRN
Qty: 60 TABLET | Refills: 0 | OUTPATIENT
Start: 2021-03-09

## 2021-03-09 RX ORDER — CLONAZEPAM 2 MG/1
2 TABLET ORAL 2 TIMES DAILY
Qty: 60 TABLET | Refills: 0 | Status: SHIPPED | OUTPATIENT
Start: 2021-03-11 | End: 2021-04-05

## 2021-03-09 NOTE — TELEPHONE ENCOUNTER
Dr. Watson,     Pt is calling about his RX for clonazepam.   RX was last filled & picked up on 2/9, (checked ).  Virtual phone visit scheduled with Dr. Watson on Thursday.   3/11/2021 4:20 PM.    Are you able to refill RX for pt?  RX was last filled on 2/9. Due for refill.  He will be out before his appt on Thursday.  RX pending for approval.

## 2021-03-10 DIAGNOSIS — F41.1 GENERALIZED ANXIETY DISORDER: ICD-10-CM

## 2021-03-10 RX ORDER — CLONAZEPAM 2 MG/1
2 TABLET ORAL 2 TIMES DAILY
Qty: 60 TABLET | Refills: 0 | Status: CANCELLED | OUTPATIENT
Start: 2021-03-11

## 2021-03-11 ENCOUNTER — VIRTUAL VISIT (OUTPATIENT)
Dept: INTERNAL MEDICINE | Facility: CLINIC | Age: 36
End: 2021-03-11
Payer: COMMERCIAL

## 2021-03-11 DIAGNOSIS — F33.42 RECURRENT MAJOR DEPRESSION IN COMPLETE REMISSION (H): Primary | ICD-10-CM

## 2021-03-11 DIAGNOSIS — F90.9 ATTENTION DEFICIT HYPERACTIVITY DISORDER (ADHD), UNSPECIFIED ADHD TYPE: ICD-10-CM

## 2021-03-11 DIAGNOSIS — F13.20 BENZODIAZEPINE DEPENDENCE (H): ICD-10-CM

## 2021-03-11 DIAGNOSIS — F41.1 GENERALIZED ANXIETY DISORDER: ICD-10-CM

## 2021-03-11 PROCEDURE — 99442 PR PHYSICIAN TELEPHONE EVALUATION 11-20 MIN: CPT | Mod: 95 | Performed by: FAMILY MEDICINE

## 2021-03-11 RX ORDER — BUPROPION HYDROCHLORIDE 150 MG/1
150 TABLET ORAL EVERY MORNING
Qty: 90 TABLET | Refills: 3 | Status: SHIPPED | OUTPATIENT
Start: 2021-03-11

## 2021-03-11 ASSESSMENT — ANXIETY QUESTIONNAIRES
5. BEING SO RESTLESS THAT IT IS HARD TO SIT STILL: NOT AT ALL
IF YOU CHECKED OFF ANY PROBLEMS ON THIS QUESTIONNAIRE, HOW DIFFICULT HAVE THESE PROBLEMS MADE IT FOR YOU TO DO YOUR WORK, TAKE CARE OF THINGS AT HOME, OR GET ALONG WITH OTHER PEOPLE: NOT DIFFICULT AT ALL
3. WORRYING TOO MUCH ABOUT DIFFERENT THINGS: NOT AT ALL
GAD7 TOTAL SCORE: 0
2. NOT BEING ABLE TO STOP OR CONTROL WORRYING: NOT AT ALL
1. FEELING NERVOUS, ANXIOUS, OR ON EDGE: NOT AT ALL
7. FEELING AFRAID AS IF SOMETHING AWFUL MIGHT HAPPEN: NOT AT ALL
6. BECOMING EASILY ANNOYED OR IRRITABLE: NOT AT ALL

## 2021-03-11 ASSESSMENT — PATIENT HEALTH QUESTIONNAIRE - PHQ9
SUM OF ALL RESPONSES TO PHQ QUESTIONS 1-9: 0
5. POOR APPETITE OR OVEREATING: NOT AT ALL
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
## 2021-03-12 ASSESSMENT — ANXIETY QUESTIONNAIRES: GAD7 TOTAL SCORE: 0

## 2021-04-04 ENCOUNTER — MYC MEDICAL ADVICE (OUTPATIENT)
Dept: INTERNAL MEDICINE | Facility: CLINIC | Age: 36
End: 2021-04-04

## 2021-04-04 DIAGNOSIS — F41.1 GENERALIZED ANXIETY DISORDER: ICD-10-CM

## 2021-04-05 ENCOUNTER — MYC REFILL (OUTPATIENT)
Dept: INTERNAL MEDICINE | Facility: CLINIC | Age: 36
End: 2021-04-05

## 2021-04-05 DIAGNOSIS — F41.1 GENERALIZED ANXIETY DISORDER: ICD-10-CM

## 2021-04-05 RX ORDER — CLONAZEPAM 2 MG/1
2 TABLET ORAL 2 TIMES DAILY
Qty: 60 TABLET | Refills: 0 | Status: SHIPPED | OUTPATIENT
Start: 2021-04-08 | End: 2021-04-07

## 2021-04-05 RX ORDER — CLONAZEPAM 2 MG/1
2 TABLET ORAL 2 TIMES DAILY
Qty: 60 TABLET | Refills: 0 | Status: CANCELLED | OUTPATIENT
Start: 2021-04-05

## 2021-04-05 NOTE — TELEPHONE ENCOUNTER
Pt calling is not out of medication and pt is going to have a busy week . Prefer to get today to his schedule . Pt   PCP gone out of clinic .Aisha Woodard RN

## 2021-04-05 NOTE — TELEPHONE ENCOUNTER
Routing refill request to provider for review/approval because:  Drug not on the [facility] refill protocol   Last refill 3/11 #60 with no refills  Last OV 3/11/21    Patient called to remind us her Klonopin is due soon.    Pended 30 days to begin on April 11th which is 30 days after last refill.    Fang Reina, MSN, RN  Triage RN  [facility]

## 2021-04-06 RX ORDER — CLONAZEPAM 2 MG/1
TABLET ORAL
Qty: 60 TABLET | Refills: 0 | Status: SHIPPED | OUTPATIENT
Start: 2021-04-08 | End: 2021-04-30

## 2021-04-07 RX ORDER — CLONAZEPAM 2 MG/1
2 TABLET ORAL 2 TIMES DAILY
Qty: 60 TABLET | Refills: 0 | OUTPATIENT
Start: 2021-04-07

## 2021-04-07 NOTE — TELEPHONE ENCOUNTER
Controlled Substance Refill Request for   Patient is followed by [facility] for ongoing prescription of benzodiazepines.  All refills should be approved by this provider, or covering partner.    Medication(s): clonazepam 1 mg BID, Gabapentin 600 mg  Maximum quantity per month: 60  Clinic visit frequency required: Q 3 months     Controlled substance agreement on file: No  Benzodiazepine use reviewed by psychiatry:  No    [place] reviewed:  reviewed October 16, 2020- early fills noted  [place]  [email or web address]        Previous failed medications: celexa (nausea), effexor, zoloft, paxil    Problem List Complete:  Yes

## 2021-04-30 ENCOUNTER — MYC MEDICAL ADVICE (OUTPATIENT)
Dept: INTERNAL MEDICINE | Facility: CLINIC | Age: 36
End: 2021-04-30

## 2021-04-30 DIAGNOSIS — F41.1 GENERALIZED ANXIETY DISORDER: ICD-10-CM

## 2021-04-30 RX ORDER — CLONAZEPAM 2 MG/1
TABLET ORAL
Qty: 60 TABLET | Refills: 0 | Status: SHIPPED | OUTPATIENT
Start: 2021-05-06 | End: 2021-05-30

## 2021-04-30 NOTE — TELEPHONE ENCOUNTER
Controlled Substance Refill Request for   Clonazepam 2mg  Problem List Complete:  No     PROVIDER TO CONSIDER COMPLETION OF PROBLEM LIST AND OVERVIEW/CONTROLLED SUBSTANCE AGREEMENT    Last Written Prescription Date:  4-8-21  Last Fill Quantity: 60,   # refills: 0    [facility]: 3-11-21    Future Office visit:     Controlled substance agreement:   Encounter-Level CSA:    There are no encounter-level csa.     Patient-Level CSA:    Controlled Substance Agreement - Non - Opioid - Scan on 5/16/2019 11:50 AM: NON-OPIOID CONTROLLED SUBSTANCE AGREEMENT         Last Urine Drug Screen: No results found for: CDAUT, No results found for: COMDAT, No results found for: THC13, PCP13, COC13, MAMP13, OPI13, AMP13, BZO13, TCA13, MTD13, BAR13, OXY13, PPX13, BUP13     Processing:  Rx to be electronically transmitted to pharmacy by provider      [email or web address]       checked in past 3 months?  No, route to RN    to be checked by provider.     Rachael Larson RN

## 2021-05-04 DIAGNOSIS — F41.1 GENERALIZED ANXIETY DISORDER: ICD-10-CM

## 2021-05-05 RX ORDER — GABAPENTIN 600 MG/1
TABLET ORAL
Qty: 150 TABLET | Refills: 3 | Status: SHIPPED | OUTPATIENT
Start: 2021-05-05 | End: 2021-08-19

## 2021-05-30 ENCOUNTER — E-VISIT (OUTPATIENT)
Dept: INTERNAL MEDICINE | Facility: CLINIC | Age: 36
End: 2021-05-30
Payer: MEDICARE

## 2021-05-30 ENCOUNTER — MYC REFILL (OUTPATIENT)
Dept: INTERNAL MEDICINE | Facility: CLINIC | Age: 36
End: 2021-05-30

## 2021-05-30 ENCOUNTER — NURSE TRIAGE (OUTPATIENT)
Dept: NURSING | Facility: CLINIC | Age: 36
End: 2021-05-30

## 2021-05-30 ENCOUNTER — MYC MEDICAL ADVICE (OUTPATIENT)
Dept: INTERNAL MEDICINE | Facility: CLINIC | Age: 36
End: 2021-05-30

## 2021-05-30 DIAGNOSIS — F41.1 GENERALIZED ANXIETY DISORDER: ICD-10-CM

## 2021-05-30 DIAGNOSIS — F41.1 GENERALIZED ANXIETY DISORDER: Primary | ICD-10-CM

## 2021-05-30 PROCEDURE — 99207 PR NO CHARGE LOS: CPT | Performed by: FAMILY MEDICINE

## 2021-05-30 RX ORDER — CLONAZEPAM 2 MG/1
2 TABLET ORAL 2 TIMES DAILY
Qty: 60 TABLET | Refills: 0 | Status: CANCELLED | OUTPATIENT
Start: 2021-05-30

## 2021-05-30 NOTE — TELEPHONE ENCOUNTER
Patient calling requesting refill of clonazepam.  Per chart, refill request has been sent to PCP clinic.    Jazmin Huston RN  Triage Nurse Advisor    Reason for Disposition    [1] Caller requesting a NON-URGENT new prescription or refill AND [2] triager unable to refill per unit policy    Protocols used: MEDICATION QUESTION CALL-A-AH

## 2021-05-31 NOTE — PATIENT INSTRUCTIONS
Thank you for choosing us for your care. Based on your symptoms and length of illness, I do not think that you need a prescription at this time.  Please follow the care advise I've provided and use the over the counter medications to help relieve your symptoms.   View your full visit summary for details by clicking on the link below.     If you're not feeling better within 2-3 days, please respond to this message and we can consider if a prescription is needed.  You can schedule an appointment right here in [facility], or call [phone]  If the visit is for the same symptoms as your eVisit, we'll refund the cost of your eVisit if seen within seven days.

## 2021-06-01 RX ORDER — CLONAZEPAM 2 MG/1
TABLET ORAL
Qty: 60 TABLET | Refills: 0 | OUTPATIENT
Start: 2021-06-01

## 2021-06-01 RX ORDER — CLONAZEPAM 2 MG/1
2 TABLET ORAL 2 TIMES DAILY
Qty: 60 TABLET | Refills: 0 | Status: SHIPPED | OUTPATIENT
Start: 2021-06-01 | End: 2021-06-26

## 2021-06-01 NOTE — TELEPHONE ENCOUNTER
Routing refill request to provider for review/approval because:  Drug not on the [facility] refill protocol     Fang Reina, Triage RN  [facility]

## 2021-06-02 RX ORDER — CLONAZEPAM 2 MG/1
2 TABLET ORAL 2 TIMES DAILY
Qty: 60 TABLET | Refills: 0 | OUTPATIENT
Start: 2021-06-02

## 2021-06-03 ENCOUNTER — VIRTUAL VISIT (OUTPATIENT)
Dept: INTERNAL MEDICINE | Facility: CLINIC | Age: 36
End: 2021-06-03
Payer: COMMERCIAL

## 2021-06-03 DIAGNOSIS — F90.9 ATTENTION DEFICIT HYPERACTIVITY DISORDER (ADHD), UNSPECIFIED ADHD TYPE: ICD-10-CM

## 2021-06-03 DIAGNOSIS — F13.20 BENZODIAZEPINE DEPENDENCE (H): ICD-10-CM

## 2021-06-03 DIAGNOSIS — F41.1 GENERALIZED ANXIETY DISORDER: ICD-10-CM

## 2021-06-03 DIAGNOSIS — F33.42 RECURRENT MAJOR DEPRESSION IN COMPLETE REMISSION (H): Primary | ICD-10-CM

## 2021-06-03 PROCEDURE — 99213 OFFICE O/P EST LOW 20 MIN: CPT | Mod: 95 | Performed by: FAMILY MEDICINE

## 2021-06-03 ASSESSMENT — ANXIETY QUESTIONNAIRES
5. BEING SO RESTLESS THAT IT IS HARD TO SIT STILL: NOT AT ALL
6. BECOMING EASILY ANNOYED OR IRRITABLE: NOT AT ALL
IF YOU CHECKED OFF ANY PROBLEMS ON THIS QUESTIONNAIRE, HOW DIFFICULT HAVE THESE PROBLEMS MADE IT FOR YOU TO DO YOUR WORK, TAKE CARE OF THINGS AT HOME, OR GET ALONG WITH OTHER PEOPLE: NOT DIFFICULT AT ALL
1. FEELING NERVOUS, ANXIOUS, OR ON EDGE: NOT AT ALL
GAD7 TOTAL SCORE: 0
2. NOT BEING ABLE TO STOP OR CONTROL WORRYING: NOT AT ALL
3. WORRYING TOO MUCH ABOUT DIFFERENT THINGS: NOT AT ALL
7. FEELING AFRAID AS IF SOMETHING AWFUL MIGHT HAPPEN: NOT AT ALL

## 2021-06-03 ASSESSMENT — PATIENT HEALTH QUESTIONNAIRE - PHQ9
SUM OF ALL RESPONSES TO PHQ QUESTIONS 1-9: 0
5. POOR APPETITE OR OVEREATING: NOT AT ALL

## 2021-06-03 NOTE — PATIENT INSTRUCTIONS
The patient did not need any refills on his medications today.  We did have a discussion about my upcoming snf.  He is arranging to have telephone visits with different practitioners to see if they are willing to continue with the treatment plan as it is currently constructed.  He will for sure follow-up in 3 months.  I will continue to refill his medications until snf.

## 2021-06-03 NOTE — PROGRESS NOTES
Ramon is a 36 year old who is being evaluated via a billable telephone visit.      What phone number would you like to be contacted at? [phone]  How would you like to obtain your AVS? MyChart    Assessment & Plan     Recurrent major depression in complete remission (H)      Generalized anxiety disorder      Benzodiazepine dependence (H)      Attention deficit hyperactivity disorder (ADHD), unspecified ADHD type                 Patient Instructions   The patient did not need any refills on his medications today.  We did have a discussion about my upcoming nursing home.  He is arranging to have telephone visits with different practitioners to see if they are willing to continue with the treatment plan as it is currently constructed.  He will for sure follow-up in 3 months.  I will continue to refill his medications until nursing home.      Return in about 3 months (around 9/3/2021) for depression, anxiety, ADD.    Yeyo Watson MD  [facility]   Ramon is a 36 year old who presents for the following health issues     HPI     Medication review and  New PCP referral     Depression and Anxiety Follow-Up    How are you doing with your depression since your last visit? No change    How are you doing with your anxiety since your last visit?  No change    Are you having other symptoms that might be associated with depression or anxiety? No  See screening    Have you had a significant life event? No     Do you have any concerns with your use of alcohol or other drugs? No    Social History     Tobacco Use     Smoking status: Current Every Day Smoker     Packs/day: 0.50     Years: 7.00     Pack years: 3.50     Types: Cigarettes     Smokeless tobacco: Never Used   Substance Use Topics     Alcohol use: Yes     Alcohol/week: 0.0 standard drinks     Comment: once monthly     Drug use: No     PHQ 12/31/2019 3/11/2021 6/3/2021   PHQ-9 Total Score 0 0 0   Q9: Thoughts of better  "off dead/self-harm past 2 weeks Not at all Not at all Not at all     TAMMY-7 SCORE 12/31/2019 3/11/2021 6/3/2021   Total Score - - -   Total Score 7 0 0     Last PHQ-9 6/3/2021   1.  Little interest or pleasure in doing things 0   2.  Feeling down, depressed, or hopeless 0   3.  Trouble falling or staying asleep, or sleeping too much 0   4.  Feeling tired or having little energy 0   5.  Poor appetite or overeating 0   6.  Feeling bad about yourself 0   7.  Trouble concentrating 0   8.  Moving slowly or restless 0   Q9: Thoughts of better off dead/self-harm past 2 weeks 0   PHQ-9 Total Score 0   Difficulty at work, home, or with people Not difficult at all     TAMMY-7  6/3/2021   1. Feeling nervous, anxious, or on edge 0   2. Not being able to stop or control worrying 0   3. Worrying too much about different things 0   4. Trouble relaxing 0   5. Being so restless that it is hard to sit still 0   6. Becoming easily annoyed or irritable 0   7. Feeling afraid, as if something awful might happen 0   TAMMY-7 Total Score 0   If you checked any problems, how difficult have they made it for you to do your work, take care of things at home, or get along with other people? Not difficult at all       Suicide Assessment Five-step Evaluation and Treatment (SAFE-T)        Review of Systems   Constitutional, HEENT, cardiovascular, pulmonary, gi and gu systems are negative, except as otherwise noted.      Objective    Vitals - Patient Reported  Systolic (Patient Reported): 130  Diastolic (Patient Reported): 80  Weight (Patient Reported): 68 kg (150 lb)  Height (Patient Reported): 603 cm (19' 9.4\")  BMI (Based on Pt Reported Ht/Wt): 1.87      Vitals:  No vitals were obtained today due to virtual visit.    Physical Exam   healthy, alert and no distress  PSYCH: Alert and oriented times 3; coherent speech, normal   rate and volume, able to articulate logical thoughts, able   to abstract reason, no tangential thoughts, no hallucinations   or " delusions  His affect is normal  RESP: No cough, no audible wheezing, able to talk in full sentences  Remainder of exam unable to be completed due to telephone visits                Phone call duration: 12 minutes

## 2021-06-04 ASSESSMENT — ANXIETY QUESTIONNAIRES: GAD7 TOTAL SCORE: 0

## 2021-06-10 ENCOUNTER — VIRTUAL VISIT (OUTPATIENT)
Dept: INTERNAL MEDICINE | Facility: CLINIC | Age: 36
End: 2021-06-10
Payer: COMMERCIAL

## 2021-06-10 DIAGNOSIS — F13.20 BENZODIAZEPINE DEPENDENCE (H): Primary | ICD-10-CM

## 2021-06-10 PROCEDURE — 99441 PR PHYSICIAN TELEPHONE EVALUATION 5-10 MIN: CPT | Mod: 95 | Performed by: INTERNAL MEDICINE

## 2021-06-10 NOTE — PROGRESS NOTES
"  TELEPHONE VISIT                                                      ASSESSMENT/PLAN                                                      (F13.20) Benzodiazepine dependence (H)  (primary encounter diagnosis)  Comment: See discussion below.  Plan: patient may continue to receive refills of clonazepam from his current PCP, Dr. Watson, until Dr. Watson retires or patient finds another provider; psychiatry referral offered but declined.    Total time of call between patient and provider was 7 minutes. Provider location: office. Patient location: home.    Alma Rosa Fisher MD   [address] [address]  T: [phone], F: [phone]    SUBJECTIVE                                                      Ramon Jamison is a very pleasant 36 year old male who requested a telephone visit to discuss establishing care:    Patient is looking for \"a provider who will continue to prescribe me my medications.\"  Patient has been on clonazepam 1 mg twice a day for many years but does have a history of early refills/apparent behavior in the past (though none noted for the last couple of years).    Discussed with patient that I do not feel comfortable with prescribing clonazepam long-term without psychiatry input/oversight.  Patient made aware that if psychiatry recommends continued clonazepam 1 mg twice a day, I am happy to prescribe clonazepam going forward.    Patient is unwilling to meet with psychiatry. He has already set up two separate appointments with APPLE Neely to see if she would be willing to prescribe his clonazepam long-term, without question.    ---    (Note was completed, in part, with [employer] voice-recognition software. Documentation reviewed, but some grammatical, spelling, and word errors may remain.)    "

## 2021-06-21 ENCOUNTER — VIRTUAL VISIT (OUTPATIENT)
Dept: FAMILY MEDICINE | Facility: CLINIC | Age: 36
End: 2021-06-21
Payer: COMMERCIAL

## 2021-06-21 DIAGNOSIS — F13.20 BENZODIAZEPINE DEPENDENCE (H): ICD-10-CM

## 2021-06-21 DIAGNOSIS — F41.1 GENERALIZED ANXIETY DISORDER: ICD-10-CM

## 2021-06-21 DIAGNOSIS — F33.42 RECURRENT MAJOR DEPRESSION IN COMPLETE REMISSION (H): Primary | ICD-10-CM

## 2021-06-21 PROCEDURE — 99442 PR PHYSICIAN TELEPHONE EVALUATION 11-20 MIN: CPT | Mod: 95 | Performed by: PHYSICIAN ASSISTANT

## 2021-06-21 NOTE — PROGRESS NOTES
Ramon is a 36 year old who is being evaluated via a billable telephone visit.      What phone number would you like to be contacted at? [phone]   How would you like to obtain your AVS? [facility]    Assessment & Plan   Problem List Items Addressed This Visit        Nervous and Auditory    Benzodiazepine dependence (H)       Behavioral    Generalized anxiety disorder    Recurrent major depression in complete remission (H) - Primary         - I reviewed his medications.   He is currently taking 3000 mg gabapentin daily, 150 mg bupropion daily, and clonazepam 4 mg daily.   I would not be willing to move forward with this combination of medications for anxiety, and would suggest we start with a clonazepam taper over the next 4-6 months while increasing the bupropion as needed for anxiety.  The goal is to completely stop taking clonazepam in 6 months time.   We had a long discussion about this plan - the patient will consider this plan and move forward with me as his primary care provider if he agrees to a plan to stop clonazepam over the next 4-6 months.       Current Outpatient Medications   Medication     buPROPion (WELLBUTRIN XL) 150 MG 24 hr tablet     clonazePAM (KLONOPIN) 2 MG tablet     gabapentin (NEURONTIN) 600 MG tablet     No current facility-administered medications for this visit.                       Return in about 1 week (around 6/28/2021) for Medication Recheck if you would like to continue with me as your PCP.    Deisy Neely PA-C  [facility]    Robbi Navarro is a 36 year old who presents for the following health issues     HPI     Concern - Medication Recheck and to possibly establish care.      He has been taking Wellbutrin, gabapentin, and clonazepam daily for about 6 years.   He is looking for a provider to refill medications moving forward as his PCP.           Review of Systems         Objective           Vitals:  No vitals were obtained today due  to virtual visit.    Physical Exam   healthy, alert and no distress  PSYCH: Alert and oriented times 3; coherent speech, normal   rate and volume, able to articulate logical thoughts, able   to abstract reason, no tangential thoughts, no hallucinations   or delusions  His affect is normal  RESP: No cough, no audible wheezing, able to talk in full sentences  Remainder of exam unable to be completed due to telephone visits                Phone call duration: 17 minutes

## 2021-06-25 DIAGNOSIS — F41.1 GENERALIZED ANXIETY DISORDER: ICD-10-CM

## 2021-06-25 RX ORDER — CLONAZEPAM 2 MG/1
2 TABLET ORAL 2 TIMES DAILY
Qty: 60 TABLET | Refills: 0 | OUTPATIENT
Start: 2021-06-25

## 2021-06-26 ENCOUNTER — MYC REFILL (OUTPATIENT)
Dept: INTERNAL MEDICINE | Facility: CLINIC | Age: 36
End: 2021-06-26

## 2021-06-26 DIAGNOSIS — F41.1 GENERALIZED ANXIETY DISORDER: ICD-10-CM

## 2021-06-27 ENCOUNTER — MYC REFILL (OUTPATIENT)
Dept: INTERNAL MEDICINE | Facility: CLINIC | Age: 36
End: 2021-06-27

## 2021-06-27 DIAGNOSIS — F41.1 GENERALIZED ANXIETY DISORDER: ICD-10-CM

## 2021-06-28 ENCOUNTER — MYC MEDICAL ADVICE (OUTPATIENT)
Dept: INTERNAL MEDICINE | Facility: CLINIC | Age: 36
End: 2021-06-28

## 2021-06-28 RX ORDER — CLONAZEPAM 2 MG/1
2 TABLET ORAL 2 TIMES DAILY
Qty: 60 TABLET | Refills: 0 | OUTPATIENT
Start: 2021-06-28

## 2021-06-28 RX ORDER — CLONAZEPAM 2 MG/1
2 TABLET ORAL 2 TIMES DAILY
Qty: 60 TABLET | Refills: 0 | Status: SHIPPED | OUTPATIENT
Start: 2021-07-01 | End: 2021-07-26

## 2021-06-28 NOTE — TELEPHONE ENCOUNTER
Pt called to check status of this request, please contact if there are further questions or concerns. Pt has duplicate requests.    Shahnaz Starks on 6/28/2021 at 10:47 AM

## 2021-06-28 NOTE — TELEPHONE ENCOUNTER
Clonazepam    Last Written Prescription Date:  6/1/2021  Last Fill Quantity: 60,  # refills: 0   Last office visit: 1/15/2020  Last Virtual Visit:6/10/2021- Dr. Fisher  Last Virtual Visit: 6/03/2021- Dr. Watson  Future Office Visit:   Next 5 appointments (look out 90 days)    Jul 05, 2021  1:20 PM  Office Visit with Deisy Neely PA-C  [facility] ([facility] ) [address] [place] [id number]  [phone]             Routing refill request to provider for review/approval because:  Drug not on the [facility] refill protocol

## 2021-06-29 DIAGNOSIS — F41.1 GENERALIZED ANXIETY DISORDER: ICD-10-CM

## 2021-06-29 RX ORDER — CLONAZEPAM 2 MG/1
2 TABLET ORAL 2 TIMES DAILY
Qty: 60 TABLET | Refills: 0 | Status: CANCELLED | OUTPATIENT
Start: 2021-07-01

## 2021-07-22 DIAGNOSIS — F41.1 GENERALIZED ANXIETY DISORDER: ICD-10-CM

## 2021-07-22 NOTE — TELEPHONE ENCOUNTER
Clonazepam 2 mg      Last Written Prescription Date:  7/1/21  Last Fill Quantity: 60,   # refills: 0  [facility]  Future Office visit:       Routing refill request to provider for review/approval because:  Drug not on the [facility], [facility] or [facility] refill protocol or controlled substance    Sherri ZALDIVAR RN  EP Triage

## 2021-07-23 RX ORDER — CLONAZEPAM 2 MG/1
TABLET ORAL
Qty: 60 TABLET | Refills: 0 | OUTPATIENT
Start: 2021-07-23

## 2021-07-23 NOTE — TELEPHONE ENCOUNTER
Patient and I do not agree on a plan to decrease his benzo prescriptions.  This is well outlined in my previous notes.   He is currently looking for a new provider who will prescribe the medication.  I am not willing to prescribe the medication.     Clive Neely PA-C

## 2021-07-26 ENCOUNTER — MYC MEDICAL ADVICE (OUTPATIENT)
Dept: INTERNAL MEDICINE | Facility: CLINIC | Age: 36
End: 2021-07-26

## 2021-08-19 DIAGNOSIS — F41.1 GENERALIZED ANXIETY DISORDER: ICD-10-CM

## 2021-08-19 RX ORDER — GABAPENTIN 600 MG/1
TABLET ORAL
Qty: 150 TABLET | Refills: 3 | Status: SHIPPED | OUTPATIENT
Start: 2021-08-19

## 2021-09-03 ENCOUNTER — VIRTUAL VISIT (OUTPATIENT)
Dept: FAMILY MEDICINE | Facility: CLINIC | Age: 36
End: 2021-09-03
Payer: COMMERCIAL

## 2021-09-03 DIAGNOSIS — F13.20 BENZODIAZEPINE DEPENDENCE (H): Primary | ICD-10-CM

## 2021-09-03 DIAGNOSIS — F40.10 SOCIAL PHOBIA: ICD-10-CM

## 2021-09-03 DIAGNOSIS — F41.1 GENERALIZED ANXIETY DISORDER: ICD-10-CM

## 2021-09-03 DIAGNOSIS — F90.9 ATTENTION DEFICIT HYPERACTIVITY DISORDER (ADHD), UNSPECIFIED ADHD TYPE: ICD-10-CM

## 2021-09-03 DIAGNOSIS — F33.42 RECURRENT MAJOR DEPRESSION IN COMPLETE REMISSION (H): ICD-10-CM

## 2021-09-03 PROCEDURE — 99442 PR PHYSICIAN TELEPHONE EVALUATION 11-20 MIN: CPT | Performed by: PHYSICIAN ASSISTANT

## 2021-09-03 NOTE — PROGRESS NOTES
Ramon is a 36 year old who is being evaluated via a billable telephone visit.      What phone number would you like to be contacted at? [phone]  How would you like to obtain your AVS? [facility]    Assessment & Plan   Problem List Items Addressed This Visit        Nervous and Auditory    Benzodiazepine dependence (H) - Primary    Relevant Orders    MENTAL HEALTH REFERRAL  - Adult; Psychiatry, Addiction Medicine Provider; Psychiatry; Collaborative Care Psychiatry Service/Bridge to Long-Term Psychiatry as indicated ([phone]); Yes; Chronic Mental Health without improvement; Yes; We will ...       Behavioral    Attention deficit hyperactivity disorder (ADHD)    Relevant Orders    MENTAL HEALTH REFERRAL  - Adult; Psychiatry, Addiction Medicine Provider; Psychiatry; Collaborative Care Psychiatry Service/Bridge to Long-Term Psychiatry as indicated ([phone]); Yes; Chronic Mental Health without improvement; Yes; We will ...    Generalized anxiety disorder    Relevant Orders    MENTAL HEALTH REFERRAL  - Adult; Psychiatry, Addiction Medicine Provider; Psychiatry; Collaborative Care Psychiatry Service/Bridge to Long-Term Psychiatry as indicated ([phone]); Yes; Chronic Mental Health without improvement; Yes; We will ...    Recurrent major depression in complete remission (H)    Relevant Orders    MENTAL HEALTH REFERRAL  - Adult; Psychiatry, Addiction Medicine Provider; Psychiatry; Collaborative Care Psychiatry Service/Bridge to Long-Term Psychiatry as indicated ([phone]); Yes; Chronic Mental Health without improvement; Yes; We will ...    Social phobia    Relevant Orders    MENTAL HEALTH REFERRAL  - Adult; Psychiatry, Addiction Medicine Provider; Psychiatry; Collaborative Care Psychiatry Service/Bridge to Long-Term Psychiatry as indicated ([phone]); Yes; Chronic Mental Health without improvement; Yes; We will ...         Patient is looking for a PCP to continue with clonazepam refills,  previous PCP retired in Aug 2021  Rx - clonazepam 2 mg BID.  He has been taking for several years.    reflects patient has been getting clonazepam Rx from PCP only for the past year, consistent refills of 60 tabs every 30 days.     We had a long discussion a few weeks ago about my plan if he were to establish with me as PCP.   Patient will need to establish care with psychiatry and addiction medicine to discuss benzodiazepine taper.  I have safety concerns with indefinite daily benzodiazepine use, and we have many other, much safer, treatment options for daily anxiety medications (ssri, snri).      Patient has agreed to the following plan:  - I will send a refill of clonazepam on 9/20/21 for 60 tabs.   - Patient must establish care with psychiatry/addiction medicine for future refills - if he has the appointment scheduled, and needs interim refills, I can send those for him.                   Return in about 6 weeks (around 10/15/2021) for Preventive Care Visit.    Deisy Neely PA-C  [facility] ANASTASIIA Navarro is a 36 year old who presents for the following health issues     HPI     New Patient/Transfer of Care  - Still looking for a PCP        FROM MY PREVIOUS NOTE:  - I reviewed his medications.   He is currently taking 3000 mg gabapentin daily, 150 mg bupropion daily, and clonazepam 4 mg daily.   I would not be willing to move forward with this combination of medications for anxiety, and would suggest we start with a clonazepam taper over the next 4-6 months while increasing the bupropion as needed for anxiety.  The goal is to completely stop taking clonazepam in 6 months time.   We had a long discussion about this plan - the patient will consider this plan and move forward with me as his primary care provider if he agrees to a plan to stop clonazepam over the next 4-6 months.           Review of Systems         Objective           Vitals:  No vitals were obtained  today due to virtual visit.    Physical Exam   healthy, alert and no distress  PSYCH: Alert and oriented times 3; coherent speech, normal   rate and volume, able to articulate logical thoughts, able   to abstract reason, no tangential thoughts, no hallucinations   or delusions  His affect is normal  RESP: No cough, no audible wheezing, able to talk in full sentences  Remainder of exam unable to be completed due to telephone visits                Phone call duration: 14 minutes

## 2021-09-07 ENCOUNTER — NURSE TRIAGE (OUTPATIENT)
Dept: FAMILY MEDICINE | Facility: CLINIC | Age: 36
End: 2021-09-07

## 2021-09-07 NOTE — TELEPHONE ENCOUNTER
Nurse Triage SBAR    Situation    : Patient calling to inform that he has 1 mg of clonazepam for today. Wants a follow up appointment with Deisy Neely PA-C. No appointments available with Deisy Neely PA-C until 9/14. Patient is scheduled with Dr. Gonzalez today at 2:40, but would prefer refill until 9/14 appointment.     Patient states that he knows he messed up. Has been taking 1 or 2 clonazepam in the morning, the one at night. Sometimes has taken 3 times a day, taking up to 4 clonazepam in one day.     Denies SI or HI.    States that he does not want to go to hospital. States that he loves his life and wants to stay with family.     Background    :  Last appointment was video on 9/3 with Deisy Neely PA-C.   Patient states that he has been really stressed out about Dr. Watson retiring. Patient also states that his mother came back and has been to the hospital twice.  LOV 9/3 for benzodiazepine over use.      Assessment   : Denies SI/HI. Over use of benzodiazepine    (See information below for more triage details.)    Recommendation   : Routing to provider for recommendation on temporary refill until seen? First opening is 9/14. Only same day appointments open this Friday.     Protocol Recommended Disposition: Routine office follow-up appointment     Can we leave a detailed message on this number? YES  Phone number patient can be reached at: Cell number on file:    Telephone Information:   Mobile [phone]       Fang Alanis RN  [facility] Triage      Reason for Disposition    Patient wants to be seen    Caffeine abuse, known or suspected (e.g., > 2 cups of coffee/tea or > 4 cans of soda / day)    Additional Information    Negative: Severe difficulty breathing (e.g., struggling for each breath, speaks in single words)    Negative: Bluish (or gray) lips or face    Negative: Difficult to awaken or acting confused  (e.g., disoriented, slurred speech)    Negative: Hysterical  "or combative behavior    Negative: Sounds like a life-threatening emergency to the triager    Negative: Chest pain    Negative: Palpitations, skipped heart beat, or rapid heart beat    Negative: Cough is main symptom    Negative: Suicide thoughts, threats, attempts, or questions    Negative: Depression is main problem or symptom (e.g., feelings of sadness or hopelessness)    Negative: Difficulty breathing and persists > 10 minutes and not relieved by reassurance provided by triager    Negative: Lightheadedness or dizziness and persists > 10 minutes and not relieved by reassurance provided by triager    Negative: Alcohol or drug abuse, known or suspected, and feeling very shaky (i.e., visible tremors of hands)    Negative: Patient sounds very sick or weak to the triager    Negative: Alcohol or drug abuse, known or suspected    Negative: Taking herbal remedies    Negative: Recent traumatic event (e.g., death of a loved one, job loss, victim/witness of crime)    Negative: Requesting to talk to a counselor (e.g., mental health worker, psychiatrist)    Answer Assessment - Initial Assessment Questions  1. CONCERN: \"What happened that made you call today?\"      Out of clonazepam  2. ANXIETY SYMPTOM SCREENING: \"Can you describe how you have been feeling?\"  (e.g., tense, restless, panicky, anxious, keyed up, trouble sleeping, trouble concentrating)      Stressed out about Dr. Watson leaving. Sinking feeling. Like a despair. I don't want my life to end if I ran out of medication. I don't want to go to the hospital just because I need medication.   3. ONSET: \"How long have you been feeling this way?\"      Feeling that way off and on for months until I talked with [employer].   4. RECURRENT: \"Have you felt this way before?\"  If yes: \"What happened that time?\" \"What helped these feelings go away in the past?\"       Yes, but it would just go away. Clonazepam has kept me out of the hospital for the last 10 years.   5. RISK OF " "HARM - SUICIDAL IDEATION:  \"Do you ever have thoughts of hurting or killing yourself?\"  (e.g., yes, no, no but preoccupation with thoughts about death)    - INTENT:  \"Do you have thoughts of hurting or killing yourself right NOW?\" (e.g., yes, no, N/A)    - PLAN: \"Do you have a specific plan for how you would do this?\" (e.g., gun, knife, overdose, no plan, N/A)      No thoughts of hurting or killing at all. Last 5 days have been the worst. No plans or means. \"Nothing on the level at all!\"  6. RISK OF HARM - HOMICIDAL IDEATION:  \"Do you ever have thoughts of hurting or killing someone else?\"  (e.g., yes, no, no but preoccupation with thoughts about death)    - INTENT:  \"Do you have thoughts of hurting or killing someone right NOW?\" (e.g., yes, no, N/A)    - PLAN: \"Do you have a specific plan for how you would do this?\" (e.g., gun, knife, no plan, N/A)       Completely denies.   7. FUNCTIONAL IMPAIRMENT: \"How have things been going for you overall in your life? Have you had any more difficulties than usual doing your normal daily activities?\"  (e.g., better, same, worse; self-care, school, work, interactions)      Wonderful! Love my children, wife self. Loves life! Not having logic when I take the pill.   8. SUPPORT: \"Who is with you now?\" \"Who do you live with?\" \"Do you have family or friends nearby who you can talk to?\"       Home with kids. Lives with wife and kids.   9. THERAPIST: \"Do you have a counselor or therapist? Name?\"      Working with [employer] on this. Scheduled to see a couple of people. Referral, no appointments yet. LOV last Wednesday or Thursday.  10. STRESSORS: \"Has there been any new stress or recent changes in your life?\"        Dr. Watson retiring. My mom is back and she has been to the hospital twice since for back pain.   11. CAFFEINE ABUSE: \"Do you drink caffeinated beverages, and how much each day?\" (e.g., coffee, tea, stu)        4-6 cups of coffee a day.  12. SUBSTANCE ABUSE: \"Do you use " "any illegal drugs or alcohol?\"        no  13. OTHER SYMPTOMS: \"Do you have any other physical symptoms right now?\" (e.g., chest pain, palpitations, difficulty breathing, fever)        none  14. PREGNANCY: \"Is there any chance you are pregnant?\" \"When was your last menstrual period?\"        NA    Protocols used: ANXIETY AND PANIC ATTACK-A-OH      "

## 2021-09-07 NOTE — TELEPHONE ENCOUNTER
I agree with the provider I will not refill any medication r.  This is not appropriate and we do not do that in this clinic.

## 2021-09-07 NOTE — TELEPHONE ENCOUNTER
If patient follows the plan we previously discussed, I would be his PCP.   Next steps -   1. Schedule an appointment with addiction medicine and psychiatry.   2. Schedule a recheck with me on 9/20/21.    Clive Neely PA-C

## 2021-09-07 NOTE — TELEPHONE ENCOUNTER
"Patient given message from Deisy Neely PA-C.  Patient asked that writer reiterate that the patient plans on sticking with the plan. He is hoping for refill on 9/20 and sticking with plan with addiction medicine and psychiatry.     Patient states, \"Between now and then will have to wing it.  Do what I have to do to keep my mind and body out of danger.\"    Gave the patient phone numbers for psychiatry and addiction medicine.    Patient states that he plans to stick with her until 9/20. States that he respect her decision not to refill today.    Patient requests call back as far as 9/20 refill. Wants to know if Deisy Neely PA-C is still willing to be his doctor. Patient's intention is for her to still be his doctor. States that he respects her.     Appointment for today with Dr. Gonzalez and 9/14 with Deisy Neely PA-C cancelled.     Please advise. Triage to call the patient back.  Can we leave a detailed message on this number? YES  Phone number patient can be reached at: Cell number on file:    Telephone Information:   Mobile [phone]       Fang Alanis RN  [facility] Triage        "

## 2021-09-07 NOTE — TELEPHONE ENCOUNTER
"Patient is 13 days early for this refill.    We specifically discussed his next refill would be on 9/20/2021.  I will NOT refill early.     Patient has been looking for a new provider for several weeks that would continue prescribing clonazepam.  After several \"establish care\" visits, I agreed to take over as his PCP but only if he follows the outlined plan, which includes an addiction medicine and psychiatry visit.      I do not agree to refill the clonazepam today.     Clive Neely PA-C    "

## 2021-09-07 NOTE — TELEPHONE ENCOUNTER
Patient given message from Deisy Neely PA-C. Patient scheduled for 9/20/21 at 12:40. Fang Alanis RN

## 2021-09-20 ENCOUNTER — OFFICE VISIT (OUTPATIENT)
Dept: FAMILY MEDICINE | Facility: CLINIC | Age: 36
End: 2021-09-20
Payer: COMMERCIAL

## 2021-09-20 VITALS
OXYGEN SATURATION: 99 % | DIASTOLIC BLOOD PRESSURE: 80 MMHG | HEART RATE: 64 BPM | WEIGHT: 144 LBS | SYSTOLIC BLOOD PRESSURE: 142 MMHG | BODY MASS INDEX: 19 KG/M2

## 2021-09-20 DIAGNOSIS — F13.11 SEDATIVE, HYPNOTIC OR ANXIOLYTIC ABUSE, IN REMISSION (H): ICD-10-CM

## 2021-09-20 DIAGNOSIS — F13.20 BENZODIAZEPINE DEPENDENCE (H): Primary | ICD-10-CM

## 2021-09-20 DIAGNOSIS — F41.1 GENERALIZED ANXIETY DISORDER: ICD-10-CM

## 2021-09-20 LAB
AMPHETAMINES UR QL: NOT DETECTED
BARBITURATES UR QL SCN: NOT DETECTED
BENZODIAZ UR QL SCN: NOT DETECTED
BUPRENORPHINE UR QL: NOT DETECTED
CANNABINOIDS UR QL: DETECTED
COCAINE UR QL SCN: NOT DETECTED
D-METHAMPHET UR QL: NOT DETECTED
METHADONE UR QL SCN: DETECTED
OPIATES UR QL SCN: NOT DETECTED
OXYCODONE UR QL SCN: NOT DETECTED
PCP UR QL SCN: NOT DETECTED
PROPOXYPH UR QL: NOT DETECTED
TRICYCLICS UR QL SCN: NOT DETECTED

## 2021-09-20 PROCEDURE — 80306 DRUG TEST PRSMV INSTRMNT: CPT | Performed by: PHYSICIAN ASSISTANT

## 2021-09-20 PROCEDURE — 99214 OFFICE O/P EST MOD 30 MIN: CPT | Performed by: PHYSICIAN ASSISTANT

## 2021-09-20 RX ORDER — CLONAZEPAM 2 MG/1
2 TABLET ORAL 2 TIMES DAILY
Qty: 14 TABLET | Refills: 0 | Status: SHIPPED | OUTPATIENT
Start: 2021-09-20 | End: 2021-09-27

## 2021-09-20 RX ORDER — CLONAZEPAM 2 MG/1
2 TABLET ORAL 2 TIMES DAILY
Qty: 60 TABLET | Refills: 0 | Status: SHIPPED | OUTPATIENT
Start: 2021-09-20 | End: 2021-09-20

## 2021-09-20 NOTE — PATIENT INSTRUCTIONS
Patient Instructions    Visit Type: VIDEO VISIT NEW  Please Note: This is a virtual visit; there is no need to come to the facility.       Panel:      Reason for early arrival:      Directions to Department    Department Location: [address] [place] [address])[place]  [address]  Parking is available in the Red ramp or using [email or web address] services located in the [facility] loop.  Office is on the first floor next door to the [employer] restaurant.   From Sign In Desk:      Department Address: [address] [address]    Department Phone: [phone]    outcome

## 2021-09-20 NOTE — TELEPHONE ENCOUNTER
Patient waist circumference is 75.5cm   Reason for Call:  Other prescription  Detailed comments: patient is at pharmacy waiting for his medication LORazepam (ATIVAN) 1 MG tablet to go through was seen today  Phone Number Patient can be reached at: Home number on file [phone] (home)  Best Time: any  Can we leave a detailed message on this number? YES  Call taken on 12/2/2019 at 11:53 AM by SHASHI RAMIREZ

## 2021-09-20 NOTE — PROGRESS NOTES
Assessment & Plan   Problem List Items Addressed This Visit        Nervous and Auditory    Benzodiazepine dependence (H) - Primary    Relevant Medications    clonazePAM (KLONOPIN) 2 MG tablet    Other Relevant Orders    Drug Abuse Screen Panel 13, Urine (Pain Care Package) - lab collect       Behavioral    Generalized anxiety disorder    Relevant Medications    clonazePAM (KLONOPIN) 2 MG tablet      Other Visit Diagnoses     Sedative, hypnotic or anxiolytic abuse, in remission (H)         Relevant Orders    Drug Abuse Screen Panel 13, Urine (Pain Care Package) - lab collect         CSA was printed and signed today.  Patient was encouraged to read the document prior to signing, so I could answer any questions he might have.  He then signed the document without asking any questions.  Patient will plan to transfer care to addiction medicine and psychiatry after his next appointment - with a goal of benzo taper.     Patient was told he needed to leave a urine sample as part of the controlled substance agreement - he stated he needed to leave because his wife was waiting for him and asked if he could return to give a urine sample on a different day.   After the AVS was printed and the clonazepam prescription filled, the patient was directed to the lab in order to collect the urine sample.   Patient then left the clinic without leaving a urine sample.  We called the pharmacy and cancelled the first prescription since he did not leave the urine sample.   Patient was informed the prescription was canceled, then returned to clinic and left a urine sample.  7-day fill of clonazepam sent to the pharmacy.  We will not refill until urine drug screen results are complete and evaluated.         31 minutes spent on the date of the encounter doing chart review, history and exam, documentation and further activities per the note           Return in about 6 weeks (around 11/2/2021) for Specialist Appointment - psychiatry visit  scheduled on 11/2/2021.    Deisy Neely PA-C  [facility] ANASTASIIA Navarro is a 36 year old who presents for the following health issues     HPI     New Patient/Transfer of Care  - lost track of medication - took too much with additional stressors   - spacing out benzo doses  States he has been taking the clonazepam and had just spaced out the doses.     Appointment is scheduled with Dr. Rogelio Barakat on 11/2/2021      Review of Systems         Objective    BP (!) 142/80   Pulse 64   Wt 65.3 kg (144 lb)   SpO2 99%   BMI 19.00 kg/m    Body mass index is 19 kg/m .  Physical Exam  Constitutional:       General: He is not in acute distress.     Appearance: He is well-developed. He is not diaphoretic.   HENT:      Head: Normocephalic.      Right Ear: External ear normal.      Left Ear: External ear normal.      Nose: Nose normal.   Eyes:      Conjunctiva/sclera: Conjunctivae normal.   Pulmonary:      Effort: Pulmonary effort is normal.   Musculoskeletal:      Cervical back: Normal range of motion.   Neurological:      Mental Status: He is alert and oriented to person, place, and time.   Psychiatric:         Judgment: Judgment normal.            No results found for any visits on 09/20/21.

## 2021-09-20 NOTE — LETTER
[facility] ANASTASIIA PRAIRIE  09/20/21  Patient: Ramon Jamison  YOB: 1985  Medical Record Number: [id number]                                                                                  Non-Opioid Controlled Substance Agreement    This is an agreement between you and your provider regarding safe and appropriate use of controlled substances prescribed by your care team. Controlled substances are?medicines that can cause physical and mental dependence (abuse).     There are strict laws about having and using these medicines. We here at [facility] are  committed to working with you in your efforts to get better. To support you in this work, we'll help you schedule regular office appointments for medicine refills. If we must cancel or change your appointment for any reason, we'll make sure you have enough medicine to last until your next appointment.     As a Provider, I will:     Listen carefully to your concerns while treating you with respect.     Recommend a treatment plan that I believe is in your best interest and may involve therapies other than medicine.      Talk with you often about the possible benefits and the risk of harm of any medicine that we prescribe for you.    Assess the safety of this medicine and check how well it works.      Provide a plan on how to taper (discontinue or go off) using this medicine if the decision is made to stop its use.      ::  As a Patient, I understand controlled substances:       Are prescribed by my care provider to help me function or work and manage my condition(s).?    Are strong medicines and can cause serious side effects.       Need to be taken exactly as prescribed.?Combining controlled substances with certain medicines or chemicals (such as illegal drugs, alcohol, sedatives, sleeping pills, and benzodiazepines) can be dangerous or even fatal.? If I stop taking my medicines suddenly, I may have severe withdrawal symptoms.     The  risks, benefits, and side effects of these medicine(s) were explained to me. I agree that:    1. I will take part in other treatments as advised by my care team. This may be psychiatry or counseling, physical therapy, behavioral therapy, group treatment or a referral to specialist.    2. I will keep all my appointments and understand this is part of the monitoring of controlled substances.?My care team may require an office visit for EVERY controlled substance refill. If I miss appointments or don t follow instructions, my care team may stop my medicine    3. I will take my medicines as prescribed. I will not change the dose or schedule unless my care team tells me to. There will be no refills if I run out early.      4. I may be asked to come to the clinic and complete a urine drug test or complete a pill count. If I don t give a urine sample or participate in a pill count, the care team may stop my medicine.    5. I will only receive controlled substance prescriptions from this clinic. If I am treated by another provider, I will tell them that I am taking controlled substances and that I have a treatment agreement with this provider. I will inform my [facility] care team within one business day if I am given a prescription for any controlled substance by another healthcare provider. My [facility] care team can contact other providers and pharmacists about my use of any medicines.    6. It is up to me to make sure that I don't run out of my medicines on weekends or holidays.?If my care team is willing to refill my prescription without a visit, I must request refills only during office hours. Refills may take up to 3 business days to process. I will use one pharmacy to fill all my controlled substance prescriptions. I will notify the clinic about any changes to my insurance or medicine availability.    7. I am responsible for my prescriptions. If the medicine/prescription is lost, stolen or destroyed,  it will not be replaced.?I also agree not to share controlled substance medicines with anyone.     8. I am aware I should not use any illegal or recreational drugs. I agree not to drink alcohol unless my care team says I can.     9. If I enroll in the [place] Medical Cannabis program, I will tell my care team before my next refill.    10. I will tell my care team right away if I become pregnant, have a new medical problem treated outside of my regular clinic, or have a change in my medicines.     11. I understand that this medicine can affect my thinking, judgment and reaction time.? Alcohol and drugs affect the brain and body, which can affect the safety of my driving. Being under the influence of alcohol or drugs can affect my decision-making, behaviors, personal safety and the safety of others. Driving while impaired (DWI) can occur if a person is driving, operating or in physical control of a car, motorcycle, boat, snowmobile, ATV, motorbike, off-road vehicle or any other motor vehicle ([place] Statute 169A.20). I understand the risk if I choose to drive or operate any vehicle or machinery.    I understand that if I do not follow any of the conditions above, my prescriptions or treatment may be stopped or changed.   I agree that my provider, clinic care team and pharmacy may work with any city, state or federal law enforcement agency that investigates the misuse, sale or other diversion of my controlled medicine. I will allow my provider to discuss my care with, or share a copy of, this agreement with any other treating provider, pharmacy or emergency room where I receive care.     I have read this agreement and have asked questions about anything I did not understand.    ________________________________________________________  Patient Signature - Ramon BECK Jamison     ___________________                   Date     ________________________________________________________  Provider Signature - Deisy Neely  PA-C       ___________________                   Date     ________________________________________________________  Witness Signature (required if provider not present while patient signing)          ___________________                   Date

## 2021-09-21 ENCOUNTER — MYC MEDICAL ADVICE (OUTPATIENT)
Dept: FAMILY MEDICINE | Facility: CLINIC | Age: 36
End: 2021-09-21

## 2021-09-22 ENCOUNTER — OFFICE VISIT (OUTPATIENT)
Dept: FAMILY MEDICINE | Facility: CLINIC | Age: 36
End: 2021-09-22
Payer: COMMERCIAL

## 2021-09-22 VITALS — TEMPERATURE: 98.4 F | DIASTOLIC BLOOD PRESSURE: 76 MMHG | HEART RATE: 77 BPM | SYSTOLIC BLOOD PRESSURE: 118 MMHG

## 2021-09-22 DIAGNOSIS — F13.20 BENZODIAZEPINE DEPENDENCE (H): Primary | ICD-10-CM

## 2021-09-22 DIAGNOSIS — F41.1 GENERALIZED ANXIETY DISORDER: ICD-10-CM

## 2021-09-22 PROCEDURE — 99214 OFFICE O/P EST MOD 30 MIN: CPT | Performed by: PHYSICIAN ASSISTANT

## 2021-09-22 NOTE — PROGRESS NOTES
Assessment & Plan       ICD-10-CM    1. Benzodiazepine dependence (H)  F13.20 MENTAL HEALTH REFERRAL  - Adult; Assessments and Testing; MH/CD Assessment Center - Assess & Treat; Chemical Health Evaluation - determine appropriate level of care and admit to program; [facility]: [facility] [phone]; We will contact you to ...   2. Generalized anxiety disorder  F41.1      - Discussed I am not willing to prescribe clonazepam going forward, especially based on early requests for refills and recent UDS. Recommend he go through medically-supervised detox given his history of withdrawal. Recommend he can either present to [facility] or I can place emergent referral for MH/CD assessment. Patient continues to focus on how he can remain on clonazepam until he sees psychiatry, but ultimately agrees to MH/CD assessment, referral placed. Discussed I am happy to follow him for other primary needs going forward.    Return in about 1 day (around 9/23/2021) for MH/CD assessment.    Qiana Meyers PA-C  [facility]arro is a 36 year old who presents for the following health issues     HPI     New Patient/Transfer of Care- Establish care   Concern - Recheck medication     Description: Will establish care with Psychiatrist on Nov 2 and will need refill untill then.     - Patient states he is here to establish care (his prior PCP retired) and get bridge prescription of his clonazepam until his appointment with psych Nov 2nd.  - Patient states he has been on clonazepam BID x10 years. His main concern is going into acute withdrawal, states he has had severe n/v and seizures in the past with BZD withdrawal.  - Has recently been seeing my colleague, Clive Neely PA-C, to establish care. She had recommended a clonazepam taper and referral to psych. Patient was seen at both ED and by Clive again requesting refill of his clonazepam, which was earlier than scheduled. Clive requested patient leave a  UDS, he stated he didn't have time and declined to leave a urine until he got to the pharmacy and found out his prescription was cancelled. He returned to clinic and left a UDS; Clive prescribed a 7-day course of clonazepam while awaiting UDS results. UDS was negative for BZD, but did show THC and methadone. Clive refused to fill clonazepam based on UDS.    Review of Systems   Constitutional, HEENT, cardiovascular, pulmonary, GI, , musculoskeletal, neuro, skin, endocrine and psych systems are negative, except as otherwise noted.      Objective    /76   Pulse 77   Temp 98.4  F (36.9  C) (Tympanic)   There is no height or weight on file to calculate BMI.  Physical Exam   GENERAL:  thin, no acute distress  PSYCH: Mental Status Exam  Behavior: somewhat agitated  Speech: rapid  Mood: worried  Affect: Euthymic  Thought Processes: Goal directed  Thought Content: no overt psychosis  Insight: Fair  Judgment: Adequate for safety  EYES: no discharge, no injection  HENT:  Normocephalic. Moist mucus membranes.  SKIN:  Warm and dry, no rash or suspicious lesions    NEUROLOGIC:  alert, sensation grossly intact.    Office Visit on 09/20/2021   Component Date Value Ref Range Status     Cannabinoids ([id number]-T* 09/20/2021 Detected* Not Detected, Indeterminate Final    Cutoff for a positive cannabinoid is greater than 50 ng/ml.  This is an unconfirmed screening result to be used for medical purposes only.      Phencyclidine 09/20/2021 Not Detected  Not Detected, Indeterminate Final    Cutoff for a negative PCP is 25 ng/mL or less.     Cocaine (Benzoylecgonine) 09/20/2021 Not Detected  Not Detected, Indeterminate Final    Cutoff for a negative cocaine is 150 ng/ml or less.     Methamphetamine (d-Methamphetamine) 09/20/2021 Not Detected  Not Detected, Indeterminate Final    Cutoff for a negative methamphetamine is 500 ng/ml or less.     Opiates (Morphine) 09/20/2021 Not Detected  Not Detected, Indeterminate Final     Cutoff for a negative opiate is 100 ng/ml or less.     Amphetamine (d-Amphetamine) 09/20/2021 Not Detected  Not Detected, Indeterminate Final    Cutoff for a negative amphetamine is 500 ng/mL or less.     Benzodiazepines (Nordiazepam) 09/20/2021 Not Detected  Not Detected, Indeterminate Final    Cutoff for a negative benzodiazepine is 150 ng/ml or less.     Tricyclic Antidepressants (Desipra* 09/20/2021 Not Detected  Not Detected, Indeterminate Final    Cutoff for a negative tricyclic antidepressant is 300 ng/ml or less.     Methadone 09/20/2021 Detected* Not Detected, Indeterminate Final    Cutoff for a positive barbiturate is greater than 200 ng/ml.  This is an unconfirmed screening result to be used for medical purposes only.      Barbiturates (Butalbital) 09/20/2021 Not Detected  Not Detected, Indeterminate Final    Cutoff for a negative barbituate is 200 ng/ml or less.     Oxycodone 09/20/2021 Not Detected  Not Detected, Indeterminate Final    Cutoff for a negative oxycodone is 100 ng/mL or less.     Propoxyphene (Norpropoxyphene) 09/20/2021 Not Detected  Not Detected, Indeterminate Final    Cutoff for a negative propoxyphene is 300 ng/ml or less.     Buprenorphine 09/20/2021 Not Detected  Not Detected, Indeterminate Final    Cutoff for a negative buprenorphine is 10 ng/ml or less.

## 2021-09-23 ENCOUNTER — TELEPHONE (OUTPATIENT)
Dept: FAMILY MEDICINE | Facility: CLINIC | Age: 36
End: 2021-09-23

## 2021-09-23 ENCOUNTER — TELEPHONE (OUTPATIENT)
Dept: NURSING | Facility: CLINIC | Age: 36
End: 2021-09-23

## 2021-09-23 NOTE — TELEPHONE ENCOUNTER
FYI    Patient called and stated he would like to establish care with PCP here at [facility] due to not having a good experience at [facility]. Patient states he felt like they didn't really list to what he was saying and were just focussed on his addiction and states he just wants help and to be treated like a human. Patient scheduled with Dr. Oliveira    This nurse told patient he is welcome to have a conversation with Dr. oliveira

## 2021-09-23 NOTE — TELEPHONE ENCOUNTER
"Pt calling looking for a bridge rx of clonazepam until seen on Monday 9/27 with Dr. Nguyễn.  Pt was given 7 days worth of medication on 9/20 and states will be out on Monday.  Pt states he has a psychiatry appt in November.    Advised Dr. Nguyễn can prescribe medication at the appt on Monday.  Advised no md will give rx early since does not know pt.  Pt then advised nurse to \"forget it and I will see about getting in sooner\".    Sherri ZALDIVAR RN  EP Triage    "

## 2021-09-24 NOTE — TELEPHONE ENCOUNTER
I am happy to be involved in this patient's care if he would like, but he should know I will not prescribe any benzodiazepines to him. I am also going to loop in our dyad leadership to his recent BAC ON TRAC message to Christiano Ledesma MD, MPH  [facility]  Internal Medicine

## 2021-09-25 ENCOUNTER — MYC REFILL (OUTPATIENT)
Dept: FAMILY MEDICINE | Facility: CLINIC | Age: 36
End: 2021-09-25

## 2021-09-25 DIAGNOSIS — F13.20 BENZODIAZEPINE DEPENDENCE (H): ICD-10-CM

## 2021-09-25 DIAGNOSIS — F41.1 GENERALIZED ANXIETY DISORDER: ICD-10-CM

## 2021-09-26 ENCOUNTER — NURSE TRIAGE (OUTPATIENT)
Dept: NURSING | Facility: CLINIC | Age: 36
End: 2021-09-26

## 2021-09-26 ENCOUNTER — MYC REFILL (OUTPATIENT)
Dept: FAMILY MEDICINE | Facility: CLINIC | Age: 36
End: 2021-09-26

## 2021-09-26 DIAGNOSIS — F13.20 BENZODIAZEPINE DEPENDENCE (H): ICD-10-CM

## 2021-09-26 DIAGNOSIS — F41.1 GENERALIZED ANXIETY DISORDER: ICD-10-CM

## 2021-09-26 RX ORDER — CLONAZEPAM 2 MG/1
2 TABLET ORAL 2 TIMES DAILY
Qty: 14 TABLET | Refills: 0 | Status: CANCELLED | OUTPATIENT
Start: 2021-09-26

## 2021-09-26 NOTE — TELEPHONE ENCOUNTER
"TELEPHONE CALL _    Pt calling in regard of clonazePAM refill request - he stated he is out of the medication today.   Asked this Nurse to send a note to Wilfredo Campos to have him a :bridge\" refill before he sees his next doctor.   Let her know Our protocol of 72 hrs refill, and that the wilfredo Campos will not refill any control medication.   Pt insisted and this RN  Lte him know the few notes on chart and apologized for not been able to help wth this requet today.    RN see the Pharmacy request for refill  Notes on MyChart refill request:         clonazePAM (KLONOPIN) 2 MG tablet [Deisy Neely PA-C]      Patient Comment: hello everyone , as it should well be known , I need the refill for this medication as i will no longer have any and as you all know my old doctor ,Mr Watson,  has retired so whoever is able to have the heart to fill this, I thank you so much!     Physicians declining this refill:    Elan Clark MD Note:09/23/21  I think the refusal to prescribe clonazepam by both Clive and Danii recently is perfectly reasonable, based on his recent UDS results and his requests for early refills.  I agree he should go through medically supervised detox, as has been recommended.  He has been given appropriate avenues to do so.   To this, we have nothing to add.      Qiana Meyers PA-C Notes  APPT on Clinic on 09/22/21    Discussed I am not willing to prescribe clonazepam going forward, especially based on early requests for refills and recent UDS. Recommend he go through medically-supervised detox given his history of withdrawal. Recommend he can either present to [facility] or I can place emergent referral for MH/CD assessment. Patient continues to focus on how he can remain on clonazepam until he sees psychiatry, but ultimately agrees to MH/CD assessment, referral placed. Discussed I am happy to follow him for other primary needs going forward.     Return in about 1 day (around 9/23/2021) for " MH/CD assessment.       No symptoms reported, no reason to TRIAGE patient for this nurse.  Patient s questions answered  Reminded we will be here 24/7 for any other questions or concerns.  Writer encouraged to continue to monitor for symptoms and call us back as necessary.  Yaneth Live RN Nurse Triage Advisor 4:45 PM 9/26/2021

## 2021-09-27 ENCOUNTER — TELEPHONE (OUTPATIENT)
Dept: INTERNAL MEDICINE | Facility: CLINIC | Age: 36
End: 2021-09-27

## 2021-09-27 RX ORDER — CLONAZEPAM 2 MG/1
2 TABLET ORAL 2 TIMES DAILY
Qty: 14 TABLET | Refills: 0 | OUTPATIENT
Start: 2021-09-27

## 2021-09-27 NOTE — TELEPHONE ENCOUNTER
"Patient called very upset that he has not been able to  get any provider to refill his clonazepam in order to keep him from withdrawal until he is able to establish care with a psychiatrist.    1.  Patient has been receiving long term treatment of anxiety with Dr Tracey who just retired.    2.  No other providers have been willing to fill clonazepam. He has been referred to psych for taper or  [facility] for medical detox    3.  Has an appointment with [facility] psychiatrist on 11/02 with whom he plans to taper off benzos    4.  Advised patient that he must go to ED for medication to prevent seizures or medical detox facility if he does not get refills.    Ventura Cesar:  Patient absolutely insisted that I send a message to you as \"she worked my brother on getting him to his  psychiatrist day.\" \"Maybe she has a heart\"  I did strongly advise patient that this refill is highly unlikely as he has not established care nor ever seen you in clinic.      Patient is requesting  that you refill his clonazepam until his Nov 2nd appt.  Pharm Pended.     Ok detailed VM message.    Fang Reina, MSN, RN   [facility] Triage    "

## 2021-09-27 NOTE — TELEPHONE ENCOUNTER
Clonazepam 2 mg    Last Written Prescription Date: 9/20/21   Last Fill Quantity: 14,   # refills: 0  Last Office Visit: 9/22/21 [facility]  Future Office visit:    Next 5 appointments (look out 90 days)    Sep 27, 2021  2:20 PM  SHORT with Vic Nguyễn MD  [facility] ([facility] ) [address] [id number] [phone]           Routing refill request to provider for review/approval because:  Drug not on the [facility], U[facility] or [facility] refill protocol or controlled substance    [place] reviewed:  reviewed- recommend provider review    [place]:  [email or web address]    Message sent through my chart on 9/25/21:Patient Comment: hello everyone , as it should well be known , I need the refill for this medication as i will no longer have any and as you all know my old doctor ,Mr Watson,  has retired so whoever is able to have the heart to fill this, I thank you so much!      Pt has appt today with Dr. Nguyễn to establish care at 2:20 pm.  See my chart message from 9/21, office visit from 9/22, and my chart message from 9/23 for details.    Sherri ZALDIVAR RN  EP Triage

## 2021-09-27 NOTE — TELEPHONE ENCOUNTER
Refill request denied. I will not fill benzodiazepines for this patient as previously stated in my past note from last week. Happy to address at his appointment later today if he desires.

## 2021-09-27 NOTE — TELEPHONE ENCOUNTER
Left detailed VM message explained Ventura Mcintosh message as written.    Fang Reina, MSN, RN   [facility]

## 2021-09-27 NOTE — TELEPHONE ENCOUNTER
Duplicate request.  See request from 9/25.  Pt has appt today at 2:20 with Dr. Nguyễn.    Sherri ZALDIVAR RN  EP Triage

## 2021-09-27 NOTE — TELEPHONE ENCOUNTER
I have never met this patient and so a refill is not appropriate.  He should follow up with psychiatry as has been advised by his providers.  If requiring detox, I agree with ER visit

## 2021-10-24 ENCOUNTER — HEALTH MAINTENANCE LETTER (OUTPATIENT)
Age: 36
End: 2021-10-24

## 2022-02-13 ENCOUNTER — HEALTH MAINTENANCE LETTER (OUTPATIENT)
Age: 37
End: 2022-02-13

## 2022-03-24 NOTE — TELEPHONE ENCOUNTER
Ok to refill tramadol   PDMP reviewed; no aberrant behavior identified, prescription authorized.     RN called [facility] in [place].  Pt did NOT  script.  RN cancelled that script.    Please send new script to the different pended pharmacy.

## 2022-10-15 ENCOUNTER — HEALTH MAINTENANCE LETTER (OUTPATIENT)
Age: 37
End: 2022-10-15

## 2023-03-26 ENCOUNTER — HEALTH MAINTENANCE LETTER (OUTPATIENT)
Age: 38
End: 2023-03-26

## 2024-05-26 ENCOUNTER — HEALTH MAINTENANCE LETTER (OUTPATIENT)
Age: 39
End: 2024-05-26
